# Patient Record
Sex: FEMALE | Race: WHITE | Employment: OTHER | URBAN - METROPOLITAN AREA
[De-identification: names, ages, dates, MRNs, and addresses within clinical notes are randomized per-mention and may not be internally consistent; named-entity substitution may affect disease eponyms.]

---

## 2022-02-24 ENCOUNTER — HOSPITAL ENCOUNTER (INPATIENT)
Age: 67
LOS: 1 days | Discharge: ANOTHER HOSPITAL | DRG: 282 | End: 2022-02-24
Attending: EMERGENCY MEDICINE
Payer: MEDICARE

## 2022-02-24 ENCOUNTER — APPOINTMENT (OUTPATIENT)
Dept: CT IMAGING | Age: 67
DRG: 282 | End: 2022-02-24
Payer: MEDICARE

## 2022-02-24 ENCOUNTER — HOSPITAL ENCOUNTER (INPATIENT)
Age: 67
LOS: 2 days | Discharge: HOME | DRG: 247 | End: 2022-02-26
Attending: INTERNAL MEDICINE | Admitting: STUDENT IN AN ORGANIZED HEALTH CARE EDUCATION/TRAINING PROGRAM
Payer: MEDICARE

## 2022-02-24 ENCOUNTER — APPOINTMENT (OUTPATIENT)
Dept: GENERAL RADIOLOGY | Age: 67
DRG: 282 | End: 2022-02-24
Payer: MEDICARE

## 2022-02-24 VITALS
HEART RATE: 74 BPM | DIASTOLIC BLOOD PRESSURE: 83 MMHG | OXYGEN SATURATION: 94 % | RESPIRATION RATE: 14 BRPM | HEIGHT: 65 IN | TEMPERATURE: 98.1 F | WEIGHT: 220 LBS | SYSTOLIC BLOOD PRESSURE: 132 MMHG | BODY MASS INDEX: 36.65 KG/M2

## 2022-02-24 DIAGNOSIS — I21.4 NSTEMI (NON-ST ELEVATED MYOCARDIAL INFARCTION) (HCC): Primary | ICD-10-CM

## 2022-02-24 DIAGNOSIS — I25.10 CAD IN NATIVE ARTERY: ICD-10-CM

## 2022-02-24 LAB
ABO/RH: NORMAL
ALBUMIN SERPL-MCNC: 4.3 G/DL (ref 3.5–5.2)
ALP BLD-CCNC: 102 U/L (ref 35–104)
ALT SERPL-CCNC: 34 U/L (ref 0–32)
ANION GAP SERPL CALCULATED.3IONS-SCNC: 14 MMOL/L (ref 7–16)
ANTIBODY SCREEN: NORMAL
APTT: 29.3 SEC (ref 24.5–35.1)
AST SERPL-CCNC: 31 U/L (ref 0–31)
BASOPHILS ABSOLUTE: 0.09 E9/L (ref 0–0.2)
BASOPHILS RELATIVE PERCENT: 0.9 % (ref 0–2)
BILIRUB SERPL-MCNC: 0.5 MG/DL (ref 0–1.2)
BILIRUBIN DIRECT: <0.2 MG/DL (ref 0–0.3)
BILIRUBIN, INDIRECT: ABNORMAL MG/DL (ref 0–1)
BUN BLDV-MCNC: 10 MG/DL (ref 6–23)
CALCIUM SERPL-MCNC: 9.1 MG/DL (ref 8.6–10.2)
CHLORIDE BLD-SCNC: 103 MMOL/L (ref 98–107)
CHOLESTEROL, TOTAL: 186 MG/DL (ref 0–199)
CO2: 22 MMOL/L (ref 22–29)
CREAT SERPL-MCNC: 0.8 MG/DL (ref 0.5–1)
D DIMER: <200 NG/ML DDU
EKG ATRIAL RATE: 73 BPM
EKG ATRIAL RATE: 78 BPM
EKG P AXIS: 60 DEGREES
EKG P AXIS: 65 DEGREES
EKG P-R INTERVAL: 132 MS
EKG P-R INTERVAL: 132 MS
EKG Q-T INTERVAL: 374 MS
EKG Q-T INTERVAL: 472 MS
EKG QRS DURATION: 82 MS
EKG QRS DURATION: 88 MS
EKG QTC CALCULATION (BAZETT): 426 MS
EKG QTC CALCULATION (BAZETT): 519 MS
EKG R AXIS: 25 DEGREES
EKG R AXIS: 30 DEGREES
EKG T AXIS: -9 DEGREES
EKG T AXIS: 41 DEGREES
EKG VENTRICULAR RATE: 73 BPM
EKG VENTRICULAR RATE: 78 BPM
EOSINOPHILS ABSOLUTE: 0.2 E9/L (ref 0.05–0.5)
EOSINOPHILS RELATIVE PERCENT: 2 % (ref 0–6)
GFR AFRICAN AMERICAN: >60
GFR NON-AFRICAN AMERICAN: >60 ML/MIN/1.73
GLUCOSE BLD-MCNC: 206 MG/DL (ref 74–99)
HBA1C MFR BLD: 8.8 % (ref 4–5.6)
HCT VFR BLD CALC: 41 % (ref 34–48)
HDLC SERPL-MCNC: 39 MG/DL
HEMOGLOBIN: 13.7 G/DL (ref 11.5–15.5)
IMMATURE GRANULOCYTES #: 0.14 E9/L
IMMATURE GRANULOCYTES %: 1.4 % (ref 0–5)
LDL CHOLESTEROL CALCULATED: 114 MG/DL (ref 0–99)
LYMPHOCYTES ABSOLUTE: 1.71 E9/L (ref 1.5–4)
LYMPHOCYTES RELATIVE PERCENT: 16.7 % (ref 20–42)
MAGNESIUM: 1.7 MG/DL (ref 1.6–2.6)
MCH RBC QN AUTO: 30.9 PG (ref 26–35)
MCHC RBC AUTO-ENTMCNC: 33.4 % (ref 32–34.5)
MCV RBC AUTO: 92.3 FL (ref 80–99.9)
MONOCYTES ABSOLUTE: 0.73 E9/L (ref 0.1–0.95)
MONOCYTES RELATIVE PERCENT: 7.1 % (ref 2–12)
NEUTROPHILS ABSOLUTE: 7.35 E9/L (ref 1.8–7.3)
NEUTROPHILS RELATIVE PERCENT: 71.9 % (ref 43–80)
PDW BLD-RTO: 13.2 FL (ref 11.5–15)
PLATELET # BLD: 232 E9/L (ref 130–450)
PMV BLD AUTO: 11.2 FL (ref 7–12)
POC ACT LR: 207 SECONDS
POC ACT LR: 280 SECONDS
POC ACT LR: 285 SECONDS
POC ACT LR: 307 SECONDS
POTASSIUM SERPL-SCNC: 3.4 MMOL/L (ref 3.5–5)
RBC # BLD: 4.44 E12/L (ref 3.5–5.5)
SARS-COV-2, NAAT: NOT DETECTED
SODIUM BLD-SCNC: 139 MMOL/L (ref 132–146)
T4 FREE: 1.13 NG/DL (ref 0.93–1.7)
TOTAL PROTEIN: 6.5 G/DL (ref 6.4–8.3)
TRIGL SERPL-MCNC: 164 MG/DL (ref 0–149)
TROPONIN, HIGH SENSITIVITY: 141 NG/L (ref 0–9)
TROPONIN, HIGH SENSITIVITY: 154 NG/L (ref 0–9)
TROPONIN, HIGH SENSITIVITY: 2183 NG/L (ref 0–9)
TSH SERPL DL<=0.05 MIU/L-ACNC: 0.89 UIU/ML (ref 0.27–4.2)
VLDLC SERPL CALC-MCNC: 33 MG/DL
WBC # BLD: 10.2 E9/L (ref 4.5–11.5)

## 2022-02-24 PROCEDURE — APPSS180 APP SPLIT SHARED TIME > 60 MINUTES: Performed by: NURSE PRACTITIONER

## 2022-02-24 PROCEDURE — 80076 HEPATIC FUNCTION PANEL: CPT

## 2022-02-24 PROCEDURE — 93454 CORONARY ARTERY ANGIO S&I: CPT | Performed by: INTERNAL MEDICINE

## 2022-02-24 PROCEDURE — 92929 PR PRQ TRLUML CORONARY STENT W/ANGIO ADDL ART/BRNCH: CPT | Performed by: INTERNAL MEDICINE

## 2022-02-24 PROCEDURE — 93005 ELECTROCARDIOGRAM TRACING: CPT | Performed by: EMERGENCY MEDICINE

## 2022-02-24 PROCEDURE — C1725 CATH, TRANSLUMIN NON-LASER: HCPCS

## 2022-02-24 PROCEDURE — 6360000002 HC RX W HCPCS: Performed by: INTERNAL MEDICINE

## 2022-02-24 PROCEDURE — 86850 RBC ANTIBODY SCREEN: CPT

## 2022-02-24 PROCEDURE — 84443 ASSAY THYROID STIM HORMONE: CPT

## 2022-02-24 PROCEDURE — 2500000003 HC RX 250 WO HCPCS

## 2022-02-24 PROCEDURE — 85025 COMPLETE CBC W/AUTO DIFF WBC: CPT

## 2022-02-24 PROCEDURE — 80061 LIPID PANEL: CPT

## 2022-02-24 PROCEDURE — 6370000000 HC RX 637 (ALT 250 FOR IP): Performed by: EMERGENCY MEDICINE

## 2022-02-24 PROCEDURE — 83735 ASSAY OF MAGNESIUM: CPT

## 2022-02-24 PROCEDURE — 92941 PRQ TRLML REVSC TOT OCCL AMI: CPT | Performed by: INTERNAL MEDICINE

## 2022-02-24 PROCEDURE — 99285 EMERGENCY DEPT VISIT HI MDM: CPT

## 2022-02-24 PROCEDURE — 87635 SARS-COV-2 COVID-19 AMP PRB: CPT

## 2022-02-24 PROCEDURE — 84439 ASSAY OF FREE THYROXINE: CPT

## 2022-02-24 PROCEDURE — 93005 ELECTROCARDIOGRAM TRACING: CPT | Performed by: NURSE PRACTITIONER

## 2022-02-24 PROCEDURE — 99223 1ST HOSP IP/OBS HIGH 75: CPT | Performed by: INTERNAL MEDICINE

## 2022-02-24 PROCEDURE — B2111ZZ FLUOROSCOPY OF MULTIPLE CORONARY ARTERIES USING LOW OSMOLAR CONTRAST: ICD-10-PCS | Performed by: INTERNAL MEDICINE

## 2022-02-24 PROCEDURE — 6370000000 HC RX 637 (ALT 250 FOR IP)

## 2022-02-24 PROCEDURE — 36415 COLL VENOUS BLD VENIPUNCTURE: CPT

## 2022-02-24 PROCEDURE — 83036 HEMOGLOBIN GLYCOSYLATED A1C: CPT

## 2022-02-24 PROCEDURE — 6370000000 HC RX 637 (ALT 250 FOR IP): Performed by: INTERNAL MEDICINE

## 2022-02-24 PROCEDURE — C1887 CATHETER, GUIDING: HCPCS

## 2022-02-24 PROCEDURE — 6360000002 HC RX W HCPCS: Performed by: NURSE PRACTITIONER

## 2022-02-24 PROCEDURE — 94640 AIRWAY INHALATION TREATMENT: CPT

## 2022-02-24 PROCEDURE — 80048 BASIC METABOLIC PNL TOTAL CA: CPT

## 2022-02-24 PROCEDURE — 85347 COAGULATION TIME ACTIVATED: CPT

## 2022-02-24 PROCEDURE — 6360000002 HC RX W HCPCS

## 2022-02-24 PROCEDURE — G0378 HOSPITAL OBSERVATION PER HR: HCPCS

## 2022-02-24 PROCEDURE — C1757 CATH, THROMBECTOMY/EMBOLECT: HCPCS

## 2022-02-24 PROCEDURE — C9600 PERC DRUG-EL COR STENT SING: HCPCS

## 2022-02-24 PROCEDURE — 93454 CORONARY ARTERY ANGIO S&I: CPT

## 2022-02-24 PROCEDURE — 85730 THROMBOPLASTIN TIME PARTIAL: CPT

## 2022-02-24 PROCEDURE — C1894 INTRO/SHEATH, NON-LASER: HCPCS

## 2022-02-24 PROCEDURE — 027135Z DILATION OF CORONARY ARTERY, TWO ARTERIES WITH TWO DRUG-ELUTING INTRALUMINAL DEVICES, PERCUTANEOUS APPROACH: ICD-10-PCS | Performed by: INTERNAL MEDICINE

## 2022-02-24 PROCEDURE — 85378 FIBRIN DEGRADE SEMIQUANT: CPT

## 2022-02-24 PROCEDURE — C1769 GUIDE WIRE: HCPCS

## 2022-02-24 PROCEDURE — 93010 ELECTROCARDIOGRAM REPORT: CPT | Performed by: INTERNAL MEDICINE

## 2022-02-24 PROCEDURE — 6370000000 HC RX 637 (ALT 250 FOR IP): Performed by: NURSE PRACTITIONER

## 2022-02-24 PROCEDURE — C1874 STENT, COATED/COV W/DEL SYS: HCPCS

## 2022-02-24 PROCEDURE — G0379 DIRECT REFER HOSPITAL OBSERV: HCPCS

## 2022-02-24 PROCEDURE — 1200000000 HC SEMI PRIVATE

## 2022-02-24 PROCEDURE — 93005 ELECTROCARDIOGRAM TRACING: CPT | Performed by: INTERNAL MEDICINE

## 2022-02-24 PROCEDURE — 71045 X-RAY EXAM CHEST 1 VIEW: CPT

## 2022-02-24 PROCEDURE — 02C03ZZ EXTIRPATION OF MATTER FROM CORONARY ARTERY, ONE ARTERY, PERCUTANEOUS APPROACH: ICD-10-PCS | Performed by: INTERNAL MEDICINE

## 2022-02-24 PROCEDURE — 84484 ASSAY OF TROPONIN QUANT: CPT

## 2022-02-24 PROCEDURE — 2709999900 HC NON-CHARGEABLE SUPPLY

## 2022-02-24 PROCEDURE — 86900 BLOOD TYPING SEROLOGIC ABO: CPT

## 2022-02-24 PROCEDURE — 86901 BLOOD TYPING SEROLOGIC RH(D): CPT

## 2022-02-24 PROCEDURE — C9601 PERC DRUG-EL COR STENT BRAN: HCPCS

## 2022-02-24 PROCEDURE — 2580000003 HC RX 258: Performed by: INTERNAL MEDICINE

## 2022-02-24 RX ORDER — SODIUM CHLORIDE 0.9 % (FLUSH) 0.9 %
5-40 SYRINGE (ML) INJECTION PRN
Status: DISCONTINUED | OUTPATIENT
Start: 2022-02-24 | End: 2022-02-26 | Stop reason: HOSPADM

## 2022-02-24 RX ORDER — HEPARIN SODIUM 1000 [USP'U]/ML
4000 INJECTION, SOLUTION INTRAVENOUS; SUBCUTANEOUS PRN
Status: DISCONTINUED | OUTPATIENT
Start: 2022-02-24 | End: 2022-02-24 | Stop reason: HOSPADM

## 2022-02-24 RX ORDER — MAGNESIUM SULFATE IN WATER 40 MG/ML
2000 INJECTION, SOLUTION INTRAVENOUS ONCE
Status: DISCONTINUED | OUTPATIENT
Start: 2022-02-24 | End: 2022-02-24 | Stop reason: HOSPADM

## 2022-02-24 RX ORDER — SODIUM CHLORIDE 9 MG/ML
INJECTION, SOLUTION INTRAVENOUS CONTINUOUS
Status: DISCONTINUED | OUTPATIENT
Start: 2022-02-24 | End: 2022-02-25

## 2022-02-24 RX ORDER — NITROGLYCERIN 0.4 MG/1
0.4 TABLET SUBLINGUAL ONCE
Status: COMPLETED | OUTPATIENT
Start: 2022-02-24 | End: 2022-02-24

## 2022-02-24 RX ORDER — SODIUM CHLORIDE 9 MG/ML
INJECTION, SOLUTION INTRAVENOUS ONCE
Status: DISCONTINUED | OUTPATIENT
Start: 2022-02-24 | End: 2022-02-24

## 2022-02-24 RX ORDER — MONTELUKAST SODIUM 10 MG/1
10 TABLET ORAL NIGHTLY
Status: DISCONTINUED | OUTPATIENT
Start: 2022-02-24 | End: 2022-02-26 | Stop reason: HOSPADM

## 2022-02-24 RX ORDER — ATORVASTATIN CALCIUM 40 MG/1
40 TABLET, FILM COATED ORAL NIGHTLY
Status: DISCONTINUED | OUTPATIENT
Start: 2022-02-24 | End: 2022-02-24 | Stop reason: HOSPADM

## 2022-02-24 RX ORDER — HEPARIN SODIUM 1000 [USP'U]/ML
4000 INJECTION, SOLUTION INTRAVENOUS; SUBCUTANEOUS ONCE
Status: COMPLETED | OUTPATIENT
Start: 2022-02-24 | End: 2022-02-24

## 2022-02-24 RX ORDER — MONTELUKAST SODIUM 10 MG/1
10 TABLET ORAL NIGHTLY
COMMUNITY

## 2022-02-24 RX ORDER — LOSARTAN POTASSIUM 50 MG/1
50 TABLET ORAL DAILY
Status: DISCONTINUED | OUTPATIENT
Start: 2022-02-25 | End: 2022-02-26 | Stop reason: HOSPADM

## 2022-02-24 RX ORDER — ALBUTEROL SULFATE 90 UG/1
2 AEROSOL, METERED RESPIRATORY (INHALATION) EVERY 4 HOURS PRN
Status: DISCONTINUED | OUTPATIENT
Start: 2022-02-24 | End: 2022-02-24 | Stop reason: CLARIF

## 2022-02-24 RX ORDER — HEPARIN SODIUM 1000 [USP'U]/ML
2000 INJECTION, SOLUTION INTRAVENOUS; SUBCUTANEOUS PRN
Status: DISCONTINUED | OUTPATIENT
Start: 2022-02-24 | End: 2022-02-24 | Stop reason: HOSPADM

## 2022-02-24 RX ORDER — ASPIRIN 81 MG/1
81 TABLET, CHEWABLE ORAL DAILY
Status: DISCONTINUED | OUTPATIENT
Start: 2022-02-25 | End: 2022-02-26 | Stop reason: HOSPADM

## 2022-02-24 RX ORDER — METOPROLOL SUCCINATE 25 MG/1
50 TABLET, EXTENDED RELEASE ORAL DAILY
Status: DISCONTINUED | OUTPATIENT
Start: 2022-02-24 | End: 2022-02-24 | Stop reason: HOSPADM

## 2022-02-24 RX ORDER — LOSARTAN POTASSIUM AND HYDROCHLOROTHIAZIDE 12.5; 5 MG/1; MG/1
1 TABLET ORAL NIGHTLY
Status: ON HOLD | COMMUNITY
End: 2022-02-25 | Stop reason: HOSPADM

## 2022-02-24 RX ORDER — UMECLIDINIUM 62.5 UG/1
1 AEROSOL, POWDER ORAL DAILY PRN
COMMUNITY

## 2022-02-24 RX ORDER — ALBUTEROL SULFATE 90 UG/1
2 AEROSOL, METERED RESPIRATORY (INHALATION) EVERY 4 HOURS PRN
COMMUNITY

## 2022-02-24 RX ORDER — BUDESONIDE AND FORMOTEROL FUMARATE DIHYDRATE 160; 4.5 UG/1; UG/1
2 AEROSOL RESPIRATORY (INHALATION) 2 TIMES DAILY
COMMUNITY

## 2022-02-24 RX ORDER — OMEPRAZOLE 20 MG/1
20 CAPSULE, DELAYED RELEASE ORAL 2 TIMES DAILY
Status: ON HOLD | COMMUNITY
End: 2022-02-25 | Stop reason: HOSPADM

## 2022-02-24 RX ORDER — POTASSIUM CHLORIDE 20 MEQ/1
40 TABLET, EXTENDED RELEASE ORAL ONCE
Status: DISCONTINUED | OUTPATIENT
Start: 2022-02-24 | End: 2022-02-24 | Stop reason: HOSPADM

## 2022-02-24 RX ORDER — IPRATROPIUM BROMIDE AND ALBUTEROL SULFATE 2.5; .5 MG/3ML; MG/3ML
1 SOLUTION RESPIRATORY (INHALATION) EVERY 4 HOURS PRN
COMMUNITY

## 2022-02-24 RX ORDER — BUDESONIDE AND FORMOTEROL FUMARATE DIHYDRATE 160; 4.5 UG/1; UG/1
2 AEROSOL RESPIRATORY (INHALATION) 2 TIMES DAILY
Status: DISCONTINUED | OUTPATIENT
Start: 2022-02-24 | End: 2022-02-24 | Stop reason: CLARIF

## 2022-02-24 RX ORDER — ASPIRIN 81 MG/1
324 TABLET, CHEWABLE ORAL ONCE
Status: COMPLETED | OUTPATIENT
Start: 2022-02-24 | End: 2022-02-24

## 2022-02-24 RX ORDER — ARFORMOTEROL TARTRATE 15 UG/2ML
15 SOLUTION RESPIRATORY (INHALATION) 2 TIMES DAILY
Status: DISCONTINUED | OUTPATIENT
Start: 2022-02-24 | End: 2022-02-26 | Stop reason: HOSPADM

## 2022-02-24 RX ORDER — BUDESONIDE 0.5 MG/2ML
0.5 INHALANT ORAL 2 TIMES DAILY
Status: DISCONTINUED | OUTPATIENT
Start: 2022-02-24 | End: 2022-02-26 | Stop reason: HOSPADM

## 2022-02-24 RX ORDER — ATORVASTATIN CALCIUM 80 MG/1
80 TABLET, FILM COATED ORAL NIGHTLY
Status: DISCONTINUED | OUTPATIENT
Start: 2022-02-24 | End: 2022-02-26 | Stop reason: HOSPADM

## 2022-02-24 RX ORDER — SODIUM CHLORIDE 0.9 % (FLUSH) 0.9 %
5-40 SYRINGE (ML) INJECTION EVERY 12 HOURS SCHEDULED
Status: DISCONTINUED | OUTPATIENT
Start: 2022-02-24 | End: 2022-02-26 | Stop reason: HOSPADM

## 2022-02-24 RX ORDER — SODIUM CHLORIDE 9 MG/ML
25 INJECTION, SOLUTION INTRAVENOUS PRN
Status: DISCONTINUED | OUTPATIENT
Start: 2022-02-24 | End: 2022-02-26 | Stop reason: HOSPADM

## 2022-02-24 RX ORDER — METOPROLOL SUCCINATE 25 MG/1
25 TABLET, EXTENDED RELEASE ORAL DAILY
Status: DISCONTINUED | OUTPATIENT
Start: 2022-02-24 | End: 2022-02-26 | Stop reason: HOSPADM

## 2022-02-24 RX ORDER — ONDANSETRON 2 MG/ML
4 INJECTION INTRAMUSCULAR; INTRAVENOUS ONCE
Status: COMPLETED | OUTPATIENT
Start: 2022-02-24 | End: 2022-02-24

## 2022-02-24 RX ORDER — ASPIRIN 81 MG/1
81 TABLET ORAL DAILY
Status: DISCONTINUED | OUTPATIENT
Start: 2022-02-24 | End: 2022-02-24 | Stop reason: HOSPADM

## 2022-02-24 RX ORDER — PANTOPRAZOLE SODIUM 40 MG/1
40 TABLET, DELAYED RELEASE ORAL
Status: DISCONTINUED | OUTPATIENT
Start: 2022-02-25 | End: 2022-02-26 | Stop reason: HOSPADM

## 2022-02-24 RX ORDER — NITROGLYCERIN 0.4 MG/1
0.4 TABLET SUBLINGUAL EVERY 5 MIN PRN
Status: DISCONTINUED | OUTPATIENT
Start: 2022-02-24 | End: 2022-02-24 | Stop reason: HOSPADM

## 2022-02-24 RX ORDER — SODIUM CHLORIDE 0.9 % (FLUSH) 0.9 %
10 SYRINGE (ML) INJECTION PRN
Status: DISCONTINUED | OUTPATIENT
Start: 2022-02-24 | End: 2022-02-24 | Stop reason: HOSPADM

## 2022-02-24 RX ORDER — IPRATROPIUM BROMIDE AND ALBUTEROL SULFATE 2.5; .5 MG/3ML; MG/3ML
1 SOLUTION RESPIRATORY (INHALATION) EVERY 4 HOURS PRN
Status: DISCONTINUED | OUTPATIENT
Start: 2022-02-24 | End: 2022-02-26 | Stop reason: HOSPADM

## 2022-02-24 RX ORDER — HEPARIN SODIUM 10000 [USP'U]/100ML
5-30 INJECTION, SOLUTION INTRAVENOUS CONTINUOUS
Status: DISCONTINUED | OUTPATIENT
Start: 2022-02-24 | End: 2022-02-24 | Stop reason: HOSPADM

## 2022-02-24 RX ORDER — ACETAMINOPHEN 325 MG/1
650 TABLET ORAL EVERY 4 HOURS PRN
Status: DISCONTINUED | OUTPATIENT
Start: 2022-02-24 | End: 2022-02-26 | Stop reason: HOSPADM

## 2022-02-24 RX ADMIN — HEPARIN SODIUM 4000 UNITS: 1000 INJECTION INTRAVENOUS; SUBCUTANEOUS at 08:36

## 2022-02-24 RX ADMIN — ONDANSETRON HYDROCHLORIDE 4 MG: 2 INJECTION, SOLUTION INTRAMUSCULAR; INTRAVENOUS at 14:20

## 2022-02-24 RX ADMIN — NITROGLYCERIN 0.4 MG: 0.4 TABLET SUBLINGUAL at 07:19

## 2022-02-24 RX ADMIN — METOPROLOL SUCCINATE 25 MG: 25 TABLET, EXTENDED RELEASE ORAL at 16:56

## 2022-02-24 RX ADMIN — ATORVASTATIN CALCIUM 80 MG: 80 TABLET, FILM COATED ORAL at 20:38

## 2022-02-24 RX ADMIN — SODIUM CHLORIDE, PRESERVATIVE FREE 10 ML: 5 INJECTION INTRAVENOUS at 20:39

## 2022-02-24 RX ADMIN — MONTELUKAST SODIUM 10 MG: 10 TABLET ORAL at 20:38

## 2022-02-24 RX ADMIN — TICAGRELOR 90 MG: 90 TABLET ORAL at 20:38

## 2022-02-24 RX ADMIN — NITROGLYCERIN 0.4 MG: 0.4 TABLET SUBLINGUAL at 10:02

## 2022-02-24 RX ADMIN — NITROGLYCERIN 0.4 MG: 0.4 TABLET SUBLINGUAL at 08:49

## 2022-02-24 RX ADMIN — SODIUM CHLORIDE: 9 INJECTION, SOLUTION INTRAVENOUS at 15:38

## 2022-02-24 RX ADMIN — ARFORMOTEROL TARTRATE 15 MCG: 15 SOLUTION RESPIRATORY (INHALATION) at 18:58

## 2022-02-24 RX ADMIN — BUDESONIDE 500 MCG: 0.5 SUSPENSION RESPIRATORY (INHALATION) at 18:58

## 2022-02-24 RX ADMIN — ASPIRIN 324 MG: 81 TABLET, CHEWABLE ORAL at 06:39

## 2022-02-24 ASSESSMENT — ENCOUNTER SYMPTOMS
BACK PAIN: 0
SHORTNESS OF BREATH: 0
WHEEZING: 0
EYE REDNESS: 0
COUGH: 0
EYE PAIN: 0
DIARRHEA: 0
ABDOMINAL DISTENTION: 0
TROUBLE SWALLOWING: 0
ABDOMINAL PAIN: 0
VOMITING: 0
SORE THROAT: 0
NAUSEA: 0
EYE DISCHARGE: 0
SINUS PRESSURE: 0

## 2022-02-24 ASSESSMENT — PAIN SCALES - GENERAL
PAINLEVEL_OUTOF10: 7
PAINLEVEL_OUTOF10: 0
PAINLEVEL_OUTOF10: 5
PAINLEVEL_OUTOF10: 9
PAINLEVEL_OUTOF10: 0

## 2022-02-24 NOTE — H&P
Hospitalist History & Physical      PCP: WARD Keys - CNP    Date of Admission: 2/24/2022    Date of Service: Pt seen/examined on 2/24/2022 and is admitted to Inpatient with expected LOS greater than two midnights due to medical therapy. Chief Complaint:  NSTEMI    History Of Present Illness:    Ms. Joe Nuno, a 73y.o. year old female  who  has a past medical history of COPD (chronic obstructive pulmonary disease) ([facility]) and Hypertension. chandlersmili from [facility] for heart cath 2/2 NSTEMI      Past Medical History:        Diagnosis Date    COPD (chronic obstructive pulmonary disease) ([address]     Hypertension        Past Surgical History:        Procedure Laterality Date    CARPAL TUNNEL RELEASE Right     CORONARY ANGIOPLASTY WITH STENT PLACEMENT  02/24/2022    3.0 x 18 Resolute stent to the prox diag and a 3.0 x 26 Resolute stent to the prox LAD by Dr. Addi Larsen         Medications Prior to Admission:      Prior to Admission medications    Medication Sig Start Date End Date Taking?  Authorizing Provider   losartan-hydroCHLOROthiazide (HYZAAR) 50-12.5 MG per tablet Take 1 tablet by mouth nightly   Yes Historical Provider, MD   Umeclidinium Bromide (INCRUSE ELLIPTA) 62.5 MCG/INH AEPB Inhale 1 puff into the lungs daily as needed (SOB/WHEEZING)   Yes Historical Provider, MD   ipratropium-albuterol (DUONEB) 0.5-2.5 (3) MG/3ML SOLN nebulizer solution Inhale 1 vial into the lungs every 4 hours as needed for Shortness of Breath   Yes Historical Provider, MD   montelukast (SINGULAIR) 10 MG tablet Take 10 mg by mouth nightly   Yes Historical Provider, MD   omeprazole (PRILOSEC) 20 MG delayed release capsule Take 20 mg by mouth 2 times daily   Yes Historical Provider, MD   albuterol sulfate HFA (VENTOLIN HFA) 108 (90 Base) MCG/ACT inhaler Inhale 2 puffs into the lungs every 4 hours as needed for Wheezing or Shortness of Breath   Yes Historical Provider, MD   budesonide-formoterol (SYMBICORT) 160-4.5 MCG/ACT AERO Inhale 2 puffs into the lungs 2 times daily   Yes Historical Provider, MD       Allergies:  Patient has no known allergies. Social History:    TOBACCO:   reports that she has been smoking. She has been smoking about 0.25 packs per day. She has never used smokeless tobacco.  ETOH:   reports previous alcohol use. Family History:    Reviewed in detail and negative for DM, CAD, Cancer, CVA. Positive as follows\"  No family history on file. REVIEW OF SYSTEMS:     Gen= den fever  Heent= den epistaxis den ear discharge  Cv= as per hpi  Pulmon= den cough  Gi= den hematochezia denies diarrhea  Gu= dem hematuria  Gyn= den vagin spotting  Endocr= denies breast discharge  Derm= den exanthems  Mskltl= + ankle edema mild    PHYSICAL EXAM:  BP (!) 122/93   Pulse 80   Temp 97.3 °F (36.3 °C)   Resp 18   Ht 5' 5\" (1.651 m)   Wt 220 lb (99.8 kg)   SpO2 93%   BMI 36.61 kg/m²   General appearance: not diaphoretic   HEENT: sinus not tender to percussion or palpation  Neck:. Trachea midline.   Respiratory:  Clear sounds upper lobes  Cardiovascular: s1 s2 audible pulse reg    Abdomen: no distension  Musculoskeletal: No clubbing, no mm pain to palpation  Skin: no exanthems or eruptions on exposed regions of Ue or face or LE  Neurologic:  Awake alert speech clear without aphasia or dysarthria  Psychiatric: calm and cooperative attentive and well amnnered  Reviewed EKG  #1 ) 0920  Sr/nl axis/rate 73 no st segm elevation  #2) 6408  Sr/nl axis/no st segment elevation     Non specif st segment changes        CXR 2/24/2022  To my view no blunting of costophrenic angles or lobar infiltrate or opacity   to radiologist   no acute process    CBC:   Recent Labs     02/24/22  0636   WBC 10.2   RBC 4.44   HGB 13.7   HCT 41.0   MCV 92.3   RDW 13.2        BMP:   Recent Labs     02/24/22  0636      K 3.4*      CO2 22   BUN 10   CREATININE 0.8   MG 1.7 LFT:  Recent Labs     02/24/22 0636   PROT 6.5   ALKPHOS 102   ALT 34*   AST 31   BILITOT 0.5     CE:  No results for input(s): Jacki Marcelo in the last 72 hours. PT/INR:   Recent Labs     02/24/22 0636   APTT 29.3     BNP: No results for input(s): BNP in the last 72 hours. ESR: No results found for: SEDRATE  CRP: No results found for: CRP  D Dimer:   Lab Results   Component Value Date    DDIMER <200 02/24/2022      Folate and B12: No results found for: LDEDARTO36, No results found for: FOLATE  Lactic Acid: No results found for: LACTA  Thyroid Studies:   Lab Results   Component Value Date    TSH 0.891 02/24/2022       Oupatient labs:  Lab Results   Component Value Date    CHOL 186 02/24/2022    TRIG 164 (H) 02/24/2022    HDL 39 02/24/2022    LDLCALC 114 (H) 02/24/2022    TSH 0.891 02/24/2022    LABA1C 8.8 (H) 02/24/2022       Urinalysis:  No results found for: NITRU, WBCUA, BACTERIA, RBCUA, BLOODU, SPECGRAV, GLUCOSEU    Imaging:  XR CHEST PORTABLE    Result Date: 2/24/2022  EXAMINATION: ONE XRAY VIEW OF THE CHEST 2/24/2022 7:36 am COMPARISON: 08/16/2013 HISTORY: ORDERING SYSTEM PROVIDED HISTORY: chest pain TECHNOLOGIST PROVIDED HISTORY: Reason for exam:->chest pain FINDINGS: The lungs are without acute focal process. There is no effusion or pneumothorax. The cardiomediastinal silhouette is without acute process. The osseous structures are without acute process. No acute process. ASSESSMENT:s/p heart cath with stenting    Principal Problem:    CAD in native artery  Active Problems:    NSTEMI (non-ST elevated myocardial infarction) ([facility]  Resolved Problems:    * No resolved hospital problems. *  copd chrnc not in exacerbation  Class III obesity  prob randall   PLAN:  As per cardiology protocols following heart cath  Continue controler inhalers for copd therapy          Diet: ADULT DIET;  Regular; Low Fat/Low Chol/High Fiber/2 gm Na  Code Status: Full Code    Surrogate decision maker confirmed with patient:  Primary Emergency Contact: Saturnino Simmons, Josr Phone: [phone]    DVT Prophylaxis: []Lovenox []Heparin []PCD [] [address] []Encouraged ambulation    Disposition: [x]Med/Surg [] Intermediate [] ICU/CCU  Admit status: [] Observation [x] Inpatient     +++++++++++++++++++++++++++++++++++++++++++++++++  Idalia Howard DO  [address]  +++++++++++++++++++++++++++++++++++++++++++++++++  NOTE: This report was transcribed using voice recognition software. Every effort was made to ensure accuracy; however, inadvertent computerized transcription errors may be present.

## 2022-02-24 NOTE — CONSULTS
Inpatient Cardiology Consultation      Reason for Consult:  NSTEMI    Consulting Physician: Dr Maria Isabel Kenyon    Requesting Physician:  Dr Sekou Blakley    Date of Consultation: 2/24/2022    HISTORY OF PRESENT ILLNESS: 74 yo  female not previously known to any cardiologist.  PCP @ [facility] in [place] (last time 1 year) used to live in [place] now lives in [facility]    PMH: HTN, BMI 36, tobacco abuse, COPD, snores ( no reported sleep study), UNVACCINATED. Last six months noticed CP with climbing up steps resolved with rest. Reports chronically SOB with minimal activity states its from \"my COPD. \"    L arm started hurting around noon then a couple hours later L side of chest achiness, R neck pain, felt dizzy, diaphoretic rated a 10 out of 10. 's yesterday @ home  Nitroglycerin SL x 1 upon arrival--> helped a little with pain-->  Nitroglycerin SL x 2 (0830) -->Currently CP 6 out of 10 appears tachypneic and uncomfortable during conversation    [facility] 2/24/2022 BP upon arrival 193/113, HR 80's SR, afebrile, and O2 saturation 96% on RA. Na 139, K+ 3.4, Bun/Cr 10/0.8, troponin 144-->154, , CBC WNL, D-dimer <200. K+ not supplemented    Currently /89 HR 70's SR, afebrile, and O2 saturation 95% on RA. There is some reproducibility with chest and R neck pain. AUC 8-3      Please note: past medical records were reviewed per electronic medical record (EMR) - see detailed reports under Past Medical/ Surgical History. Past Medical History:    1. Mother PCI in her 63's  2. BMI 36  3. 20 pack year smoker  4. Remote history of stress test  5. COPD  6. UNVACCINATED  7. HTN on Cozaar/HCTZ (50/12.5)  8. L carpal tunnel syndrome  9. Snores no history of sleep study    Past Surgical History:   Tonsillectomy, R carpal tunnel release    Medications Prior to admit: On no medications      Current Medications:    Current Facility-Administered Medications: sodium chloride flush 0.9 % injection 10 mL, 10 mL, IntraVENous, PRN    Allergies:  NKDA per patient    Social History:    1 pack a week currently when stressed smokes more  Denies ETOH  Denies Illicit drug use  Activity: Lives with  in 2 story home (farmhouse). Climbs steps is \"winded\" for forever. Code Status: Full Code      Family History: Mother  age 80 from brain tumor. + tobacco. +HTN, + DM. CAD s/p PCI (67 yo) s/p CABG in her [de-identified]  Father alive [de-identified]. CAD s/p CABG 80, VHD s/p AVR/MVR 80's, + DM, + HLD, + HTN  Sisters  63's from ETOH abuse  Sister alive no reported CAD, DM      REVIEW OF SYSTEMS:     · Constitutional: Denies fatigue, fevers, chills or night sweats  · Eyes: Denies visual changes or drainage  · ENT: Denies headaches or hearing loss. No mouth sores or sore throat. No epistaxis   · Cardiovascular: + L sided CP, Down L arm and into R neck with diaphoresis, and dizziness. Denies chest pain, pressure or palpitations. No lower extremity swelling. · Respiratory: + chronic GAGNON. Denies cough, orthopnea or PND. No hemoptysis   · Gastrointestinal: Denies hematemesis or anorexia. No hematochezia or melena    · Genitourinary: Denies urgency, dysuria or hematuria. · Musculoskeletal: Denies gait disturbance, weakness or joint complaints  · Integumentary: Denies rash, hives or pruritis   · Neurological: Denies dizziness, headaches or seizures. No numbness or tingling  · Psychiatric: Denies anxiety or depression. · Endocrine: Denies temperature intolerance. No recent weight change. .  · Hematologic/Lymphatic: Denies abnormal bruising or bleeding. No swollen lymph nodes    PHYSICAL EXAM:   /89   Pulse 70   Temp 97.9 °F (36.6 °C)   Resp 20   Ht 5' 5\" (1.651 m)   Wt 220 lb (99.8 kg)   SpO2 95%   BMI 36.61 kg/m²   CONST:  Well developed, obese  female who appears of stated age. Awake, alert and cooperative. No apparent distress.    HEENT:   Head- Normocephalic, atraumatic   Eyes- Conjunctivae pink, anicteric  Throat- Oral mucosa pink and moist  Neck-  Thick. No stridor, trachea midline, no jugular venous distention. No carotid bruit. CHEST: Chest symmetrical + tender to palpation. No accessory muscle use or intercostal retractions  RESPIRATORY: Lung sounds - clear throughout fields   CARDIOVASCULAR:     Heart Ausculation- Regular rate and rhythm, no murmur heard. PV: No lower extremity edema. No varicosities. Pedal pulses palpable, no clubbing or cyanosis  ABDOMEN: Soft, non-tender to light palpation. Bowel sounds present. No palpable masses; no abdominal bruit  MS: Good muscle strength and tone. No atrophy or abnormal movements. + bilateral calf tenderness. + R bilateral neck tenderness. : Deferred  SKIN: Warm and dry no statis dermatitis or ulcers   NEURO / PSYCH: Oriented to person, place and time. Speech clear and appropriate. Follows all commands. Pleasant affect     DATA:    ECG  as per Dr Mitchell All interpretation  Tele strips: SR    Diagnostic:    CXR 2/24/2022: No acute process      Labs:   CBC:   Recent Labs     02/24/22  0636   WBC 10.2   HGB 13.7   HCT 41.0        BMP:   Recent Labs     02/24/22  0636      K 3.4*   CO2 22   BUN 10   CREATININE 0.8   LABGLOM >60   CALCIUM 9.1     CARDIAC ENZYMES:  Recent Labs     02/24/22  0636   TROPHS 141*   A&P per Dr Renate Prajapati  Electronically signed by FREDY Alvarez on 2/24/2022 at 7:51 AM     I independently performed an evaluation on the patient. I have reviewed the above documentation completed by the advance practitioner. Please see my additional contributions to the HPI, physical exam, assessment and medical decision making.     Physical Exam   BP (!) 151/97   Pulse 67   Temp 98.1 °F (36.7 °C) (Oral)   Resp 14   Ht 5' 5\" (1.651 m)   Wt 220 lb (99.8 kg)   SpO2 95%   BMI 36.61 kg/m²   Constitutional: Oriented to person, place, and time. Well-developed and well-nourished. No distress. Head: Normocephalic and atraumatic. Eyes: EOM are normal. Pupils are equal, round, and reactive to light. Neck: Normal range of motion. Neck supple. No hepatojugular reflux and no JVD present. Carotid bruit is not present. No tracheal deviation present. No thyromegaly present. Cardiovascular: Normal rate, regular rhythm, normal heart sounds and intact distal pulses. Exam reveals no gallop and no friction rub. No murmur heard. Pulmonary/Chest: Effort normal and breath sounds normal. No respiratory distress. No wheezes. No rales. No tenderness. Abdominal: Soft. Bowel sounds are normal. No distension and no mass. No tenderness. No rebound and no guarding. Musculoskeletal: Normal range of motion. No edema and no tenderness. Lymphadenopathy:   No cervical adenopathy. Neurological: Alert and oriented to person, place, and time. Skin: Skin is warm and dry. No rash noted. Not diaphoretic. No erythema. Psychiatric: Normal mood and affect. Behavior is normal.      See accompanying documentation for full consult.     ASSESSMENT AND PLAN:  1. NSTEMI/Exertional CP:     EKG and labs reviewed.      Typical symptoms and elevated troponin.      Arrange cath. AUC 8/4. No contrast allergy.      ASA/statin/BB/IV heparin/nitrate.     Replete K and mag.      2. HTN: Observe.      3. COPD     4. Tobacco use     5. Probable EDMUNDO     Keith Shi D.O.   Cardiologist  Cardiology, [phone]

## 2022-02-24 NOTE — PROCEDURES
[address]                  [address] [id number],  [facility]                            CARDIAC CATHETERIZATION    PATIENT NAME: Christi Gonsalez                    :        1955  MED REC NO:   [id number]                            ROOM:       6484  ACCOUNT NO:   [de-identified]                           ADMIT DATE: 2022  PROVIDER:     Sameer Lino MD    DATE OF PROCEDURE:  2022    PROCEDURES:  1. Coronary angiography. 2.  Percutaneous coronary intervention with deployment of 3.0 x 18 mm  [place] Resolute drug-eluting stent in proximal and mid LAD. 3.  Deployment of 3.0 x 26 mm [place] Resolute drug-eluting stent in first  diagonal branch. 4.  Aspiration thrombectomy of first diagonal branch. 5.  Conscious sedation using Versed and fentanyl. Indication #4, score of 8. Indication for PCI is 16 and 8. INDICATION FOR PROCEDURE:  The patient is a 60-year-old female who has a  significant family history of early CAD, smoking, presented to [facility]  Emergency Room complaining of chest pain. The patient was found to have  non-ST-elevation MI. The patient's symptoms were not responding to  optimal medical therapy, so the patient was brought to the cath lab on  an urgent fashion for cardiac catheterization with the intention to  intervene as warranted. DESCRIPTION OF PROCEDURE:  After the appropriate informed consent, the  right wrist area was prepped and draped in the usual sterile fashion. A  timeout was completed. The right wrist area was locally anesthetized  with 2 mL of 2% lidocaine. A 21-gauge needle was used to access the  right radial artery. A 6-[place] citizen introducer sheath was used to cannulate  the right radial artery. A 6-[place] citizen JL4 and 6-[place] citizen JR4 catheters were  used for coronary angiography in multiple projections. We tried to use  JL3.5 for left coronary angiography unsuccessfully.     ANGIOGRAPHIC FINDINGS:  The left main coronary artery arises normally from the left sinus of  Valsalva. It is relatively short vessel, good-sized vessel without any  disease. It bifurcates into left anterior descending artery and left  circumflex artery. The left anterior descending artery is a large vessel extends down to  the apex. It has 20% disease just distal to the take off a large  diagonal branch. The first diagonal branch is a large vessel with 90%  proximal stenosis with a large thrombus in the proximal and mid segment  with NIKKI-2 flow distally. The left circumflex artery is a large vessel, gives off two large OM  branches. The left circumflex artery and its branches have no CAD. The right coronary artery arises normally from the right sinus of  Valsalva. It is a large vessel, good-sized vessel, dominant circulation  with 20% disease in the mid segment. After completing coronary angiography, we proceeded with percutaneous  coronary intervention that turned out to be very long and complex  procedure due to technical difficulty with some equipments. We were  able to engage the left main coronary artery using a CLS 3.5 guiding  catheter; however, the catheter was deep engaging and subselectively and  selectively engaging the left circumflex artery. Then, we tried to use  a CLS3; however, it was too short and it was not really engaging the  left main coronary artery and was not coaxial.  So, we opted to go ahead and use a  6-English JL4 guiding catheter. Then, we used a BMW wire to cross into  the diagonal branch. Then, we used another wire to protect the LAD  since there is ostial diagonal disease and that might affect the  LAD after stenting. Then, the lesion was predilated using a 2.0 x 15 mm  balloon. Then, we tried to advance [place] catheter, but we were not  able to advance it through the catheter, so we had to block the LAD  wire.   Then, we advanced the [place] catheter into the diagonal branch  and two aspirations were performed. Followup angiography showed  significant decrease of the thrombotic load. Then, we predilated the  diagonal branch using 2.5 x 12 mm balloon. Then, we rewired the LAD  again and tried to advance a balloon into the LAD for final kissing  balloon dilation as needed. However, we had difficulty advancing the  balloon over the wire. We tried to cover balloons that we were not  able. Then, we tried the balloon over the diagonal wire and it went  smoothly. At that time, the LAD wire was exchanged for another wire. We advanced like 3.0 x 12 mm noncompliant balloon into mid to distal  LAD. Then, we tried to advance a 3.0 x 26 mm David Resolute drug-eluting  stent into the diagonal branch during that and when we performed cine to  check the position of the stent, a dissection noted in the proximal LAD. So at that time, the balloon and the catheter and stents were taken out  and that area was stented using 3.0 x 18 mm David Resolute drug-eluting  stent into proximal to mid LAD, then proximal optimization was performed  using 4.0 x 8 mm noncompliant balloon. Then, the diagonal branch was  rewired, and we predilated the struts using 2.0 x 12 mm balloon and 2.5  x 12 mm noncompliant balloon. Then, the stent was advanced into the  diagonal branch in TAP bifurcation technique. Before that, we advanced a 3.0 x 12 mm balloon  into the LAD for the kissing balloon postdilation. The stent was  deployed. Followup angiography showed 0% residual stenosis and there  was no dissection distally, so we proceeded with kissing balloon  inflation. We advanced a 3.0 x 12 mm noncompliant balloon into the  proximal segment of the stent and kissing balloon inflation was  performed of the diagonal stent and the distal segment of the LAD stent. Followup angiography showed 0% residual stenosis and NIKKI-3 flow  distally. There is still 20% disease in the mid segment that was left  alone to be treated medically.   At the end

## 2022-02-24 NOTE — PROGRESS NOTES
vasc band discontinued per protocol. Site soft ecchymotic cleaned with alcohol dsd applied with opsite drsg applied over that.  Radial pulse 2 plus

## 2022-02-24 NOTE — CONSULTS
I independently performed an evaluation on the patient. I have reviewed the above documentation completed by the advance practitioner. Please see my additional contributions to the HPI, physical exam, assessment and medical decision making. Physical Exam   BP (!) 151/97   Pulse 67   Temp 98.1 °F (36.7 °C) (Oral)   Resp 14   Ht 5' 5\" (1.651 m)   Wt 220 lb (99.8 kg)   SpO2 95%   BMI 36.61 kg/m²   Constitutional: Oriented to person, place, and time. Well-developed and well-nourished. No distress. Head: Normocephalic and atraumatic. Eyes: EOM are normal. Pupils are equal, round, and reactive to light. Neck: Normal range of motion. Neck supple. No hepatojugular reflux and no JVD present. Carotid bruit is not present. No tracheal deviation present. No thyromegaly present. Cardiovascular: Normal rate, regular rhythm, normal heart sounds and intact distal pulses. Exam reveals no gallop and no friction rub. No murmur heard. Pulmonary/Chest: Effort normal and breath sounds normal. No respiratory distress. No wheezes. No rales. No tenderness. Abdominal: Soft. Bowel sounds are normal. No distension and no mass. No tenderness. No rebound and no guarding. Musculoskeletal: Normal range of motion. No edema and no tenderness. Lymphadenopathy:   No cervical adenopathy. Neurological: Alert and oriented to person, place, and time. Skin: Skin is warm and dry. No rash noted. Not diaphoretic. No erythema. Psychiatric: Normal mood and affect. Behavior is normal.     See accompanying documentation for full consult. ASSESSMENT AND PLAN:  1. NSTEMI/Exertional CP:    EKG and labs reviewed. Typical symptoms and elevated troponin. Arrange cath. AUC 8/4. No contrast allergy. ASA/statin/BB/IV heparin/nitrate. Replete K and mag. 2. HTN: Observe. 3. COPD    4. Tobacco use    5. Probable EDMUNDO    Latonya Cerrato D.O.   Cardiologist  Cardiology, [id number]

## 2022-02-24 NOTE — ED PROVIDER NOTES
21-year-old female history of hypertension history of smoking presents to the emergency department with chest pain that started yesterday. She states most notably pain rating to left arm causing tingling as well as into her right neck. Patient states she had a stress test many years ago but is not followed up with a cardiologist.  She states also some left hip pain that is chronic with bilateral lower extremity swelling she states no fevers no cough no shortness of breath no nausea vomiting diarrhea abdominal pain urinary symptoms or other complaints at this time    The history is provided by the patient. Chest Pain  Pain location:  Substernal area  Pain radiates to:  L shoulder, L arm and neck  Pain severity:  Mild  Onset quality:  Gradual  Duration:  1 day  Timing:  Intermittent  Progression:  Waxing and waning  Chronicity:  New  Relieved by:  Nothing  Worsened by:  Nothing  Ineffective treatments:  None tried  Associated symptoms: numbness    Associated symptoms: no abdominal pain, no altered mental status, no anxiety, no back pain, no cough, no diaphoresis, no dysphagia, no fever, no headache, no lower extremity edema, no nausea, no near-syncope, no palpitations, no shortness of breath, no syncope, no vomiting and no weakness    Risk factors: hypertension and smoking         Review of Systems   Constitutional: Negative for chills, diaphoresis and fever. HENT: Negative for ear pain, sinus pressure, sore throat and trouble swallowing. Eyes: Negative for pain, discharge and redness. Respiratory: Negative for cough, shortness of breath and wheezing. Cardiovascular: Positive for chest pain. Negative for palpitations, syncope and near-syncope. Gastrointestinal: Negative for abdominal distention, abdominal pain, diarrhea, nausea and vomiting. Genitourinary: Negative for dysuria and frequency. Musculoskeletal: Negative for arthralgias and back pain. Skin: Negative for rash and wound. Neurological: Positive for numbness. Negative for weakness and headaches. Hematological: Negative for adenopathy. All other systems reviewed and are negative. Physical Exam  Constitutional:       Appearance: Normal appearance. She is obese. HENT:      Head: Normocephalic and atraumatic. Nose: Nose normal.   Eyes:      Extraocular Movements: Extraocular movements intact. Pupils: Pupils are equal, round, and reactive to light. Cardiovascular:      Rate and Rhythm: Normal rate and regular rhythm. Heart sounds: Normal heart sounds. Musculoskeletal:      Right lower le+ Edema present. Left lower le+ Edema present. Skin:     General: Skin is warm. Capillary Refill: Capillary refill takes less than 2 seconds. Neurological:      General: No focal deficit present. Mental Status: She is alert and oriented to person, place, and time. Procedures     [facility]     ED Course as of 22 0937   Thu 2022   0741 Spoke with radiology department at approximately 7:17 AM about emergent need for CTA of chest and abdomen a second call was placed at 7:40 AM after was noted the emergent need for the scan to be done [CF]   [facility] who I have not spoken with has placed orders for NSTEMI work-up including low-dose heparin. Patient's chest pain is improved with nitroglycerin [CF]   0810 CT studies canceled after negative D-dimer.   We will go forward with heparin protocol [CF]   0935 Dr. Viridiana Ortiz is at bedside and we received a call from the [address] [place] [address] that they plan to transport patient to the Cath Lab for emergent cath patient resting comfortably with improved chest pain after being given nitro patient on heparin drip at this time patient in the process of being set up for transfer to [facility] [CF]      ED Course User Index  [CF] Yandy Bran, DO       --------------------------------------------- PAST HISTORY ---------------------------------------------  Past Medical History:  has a past medical history of COPD (chronic obstructive pulmonary disease) ([address] and Hypertension. Past Surgical History:  has a past surgical history that includes Tonsillectomy; Uterine fibroid surgery; and Carpal tunnel release (Right). Social History:  reports that she has been smoking. She has been smoking about 0.25 packs per day. She has never used smokeless tobacco. She reports previous alcohol use. She reports previous drug use. Family History: family history is not on file. The patients home medications have been reviewed. Allergies: Patient has no known allergies.     -------------------------------------------------- RESULTS -------------------------------------------------    Lab  Results for orders placed or performed during the hospital encounter of 02/24/22   CBC with Auto Differential   Result Value Ref Range    WBC 10.2 4.5 - 11.5 E9/L    RBC 4.44 3.50 - 5.50 E12/L    Hemoglobin 13.7 11.5 - 15.5 g/dL    Hematocrit 41.0 34.0 - 48.0 %    MCV 92.3 80.0 - 99.9 fL    MCH 30.9 26.0 - 35.0 pg    MCHC 33.4 32.0 - 34.5 %    RDW 13.2 11.5 - 15.0 fL    Platelets 218 150 - 779 E9/L    MPV 11.2 7.0 - 12.0 fL    Neutrophils % 71.9 43.0 - 80.0 %    Immature Granulocytes % 1.4 0.0 - 5.0 %    Lymphocytes % 16.7 (L) 20.0 - 42.0 %    Monocytes % 7.1 2.0 - 12.0 %    Eosinophils % 2.0 0.0 - 6.0 %    Basophils % 0.9 0.0 - 2.0 %    Neutrophils Absolute 7.35 (H) 1.80 - 7.30 E9/L    Immature Granulocytes # 0.14 E9/L    Lymphocytes Absolute 1.71 1.50 - 4.00 E9/L    Monocytes Absolute 0.73 0.10 - 0.95 E9/L    Eosinophils Absolute 0.20 0.05 - 0.50 E9/L    Basophils Absolute 0.09 0.00 - 0.20 M8/B   Basic Metabolic Panel   Result Value Ref Range    Sodium 139 132 - 146 mmol/L    Potassium 3.4 (L) 3.5 - 5.0 mmol/L    Chloride 103 98 - 107 mmol/L    CO2 22 22 - 29 mmol/L    Anion Gap 14 7 - 16 mmol/L    Glucose 206 (H) 74 - 99 mg/dL    BUN 10 6 - 23 mg/dL    CREATININE 0.8 0.5 - 1.0 mg/dL    GFR Non-African American >60 >=60 mL/min/1.73    GFR African American >60     Calcium 9.1 8.6 - 10.2 mg/dL   Troponin   Result Value Ref Range    Troponin, High Sensitivity 141 (H) 0 - 9 ng/L   D-Dimer, Quantitative   Result Value Ref Range    D-Dimer, Quant <200 ng/mL DDU   Troponin   Result Value Ref Range    Troponin, High Sensitivity 154 (H) 0 - 9 ng/L   APTT   Result Value Ref Range    aPTT 29.3 24.5 - 35.1 sec   Lipid Panel   Result Value Ref Range    Cholesterol, Total 186 0 - 199 mg/dL    Triglycerides 164 (H) 0 - 149 mg/dL    HDL 39 >40 mg/dL    LDL Calculated 114 (H) 0 - 99 mg/dL    VLDL Cholesterol Calculated 33 mg/dL   TSH   Result Value Ref Range    TSH 0.891 0.270 - 4.200 uIU/mL   T4, Free   Result Value Ref Range    T4 Free 1.13 0.93 - 1.70 ng/dL   Magnesium   Result Value Ref Range    Magnesium 1.7 1.6 - 2.6 mg/dL   Hepatic Function Panel   Result Value Ref Range    Total Protein 6.5 6.4 - 8.3 g/dL    Albumin 4.3 3.5 - 5.2 g/dL    Alkaline Phosphatase 102 35 - 104 U/L    ALT 34 (H) 0 - 32 U/L    AST 31 0 - 31 U/L    Total Bilirubin 0.5 0.0 - 1.2 mg/dL    Bilirubin, Direct <0.2 0.0 - 0.3 mg/dL    Bilirubin, Indirect see below 0.0 - 1.0 mg/dL   EKG 12 Lead   Result Value Ref Range    Ventricular Rate 73 BPM    Atrial Rate 73 BPM    P-R Interval 132 ms    QRS Duration 82 ms    Q-T Interval 472 ms    QTc Calculation (Bazett) 519 ms    P Axis 60 degrees    R Axis 30 degrees    T Axis 41 degrees       Radiology  XR CHEST PORTABLE    Result Date: 2/24/2022  EXAMINATION: ONE XRAY VIEW OF THE CHEST 2/24/2022 7:36 am COMPARISON: 08/16/2013 HISTORY: ORDERING SYSTEM PROVIDED HISTORY: chest pain TECHNOLOGIST PROVIDED HISTORY: Reason for exam:->chest pain FINDINGS: The lungs are without acute focal process. There is no effusion or pneumothorax. The cardiomediastinal silhouette is without acute process. The osseous structures are without acute process.      No acute process. EKG: This EKG is signed and interpreted by me. Rhythm: Sinus  Interpretation: Sinus rhythm with nonspecific but noted t wave inversions   Comparison: no previous EKG available      ------------------------- NURSING NOTES AND VITALS REVIEWED ---------------------------  Date / Time Roomed:  2/24/2022  6:04 AM  ED Bed Assignment:  12/12    The nursing notes within the ED encounter and vital signs as below have been reviewed. Patient Vitals for the past 24 hrs:   BP Temp Temp src Pulse Resp SpO2 Height Weight   02/24/22 0840 (!) 151/97 98.1 °F (36.7 °C) Oral 67 14 95 % -- --   02/24/22 0735 133/89 -- -- 70 -- 95 % -- --   02/24/22 0711 (!) 174/105 97.9 °F (36.6 °C) -- 70 20 96 % -- --   02/24/22 0604 (!) 193/113 97.7 °F (36.5 °C) Tympanic 84 22 96 % 5' 5\" (1.651 m) 220 lb (99.8 kg)       Oxygen Saturation Interpretation: Normal      ------------------------------------------ PROGRESS NOTES ------------------------------------------  I have spoken with the patient and discussed todays results, in addition to providing specific details for the plan of care and counseling regarding the diagnosis and prognosis. Their questions are answered at this time and they are agreeable with the plan.      --------------------------------- ADDITIONAL PROVIDER NOTES ---------------------------------  This patient's ED course included: a personal history and physicial examination, re-evaluation prior to disposition, multiple bedside re-evaluations, IV medications, cardiac monitoring, continuous pulse oximetry and complex medical decision making and emergency management    This patient has remained hemodynamically stable during their ED course. Please note that the withdrawal or failure to initiate urgent interventions for this patient would likely result in a life threatening deterioration or permanent disability.       Accordingly this patient received 30 minutes of critical care time, excluding separately billable procedures. Clinical Impression  1. NSTEMI (non-ST elevated myocardial infarction) [facility])          Disposition  Patient's disposition: Transfer to [facility]  Patient's condition is fair.        Risa Beckham, DO  02/24/22 40 Lashawn Watt, DO  02/24/22 0064

## 2022-02-25 LAB
ANION GAP SERPL CALCULATED.3IONS-SCNC: 11 MMOL/L (ref 7–16)
ANION GAP SERPL CALCULATED.3IONS-SCNC: 12 MMOL/L (ref 7–16)
BUN BLDV-MCNC: 11 MG/DL (ref 6–23)
BUN BLDV-MCNC: 9 MG/DL (ref 6–23)
CALCIUM SERPL-MCNC: 7.5 MG/DL (ref 8.6–10.2)
CALCIUM SERPL-MCNC: 8.3 MG/DL (ref 8.6–10.2)
CHLORIDE BLD-SCNC: 107 MMOL/L (ref 98–107)
CHLORIDE BLD-SCNC: 107 MMOL/L (ref 98–107)
CO2: 22 MMOL/L (ref 22–29)
CO2: 23 MMOL/L (ref 22–29)
CREAT SERPL-MCNC: 0.8 MG/DL (ref 0.5–1)
CREAT SERPL-MCNC: 1 MG/DL (ref 0.5–1)
EKG ATRIAL RATE: 78 BPM
EKG P AXIS: 62 DEGREES
EKG P-R INTERVAL: 130 MS
EKG Q-T INTERVAL: 420 MS
EKG QRS DURATION: 80 MS
EKG QTC CALCULATION (BAZETT): 478 MS
EKG R AXIS: 43 DEGREES
EKG T AXIS: 153 DEGREES
EKG VENTRICULAR RATE: 78 BPM
GFR AFRICAN AMERICAN: >60
GFR AFRICAN AMERICAN: >60
GFR NON-AFRICAN AMERICAN: 56 ML/MIN/1.73
GFR NON-AFRICAN AMERICAN: >60 ML/MIN/1.73
GLUCOSE BLD-MCNC: 147 MG/DL (ref 74–99)
GLUCOSE BLD-MCNC: 155 MG/DL (ref 74–99)
HCT VFR BLD CALC: 34 % (ref 34–48)
HEMOGLOBIN: 11.2 G/DL (ref 11.5–15.5)
MAGNESIUM: 1.7 MG/DL (ref 1.6–2.6)
MCH RBC QN AUTO: 30.9 PG (ref 26–35)
MCHC RBC AUTO-ENTMCNC: 32.9 % (ref 32–34.5)
MCV RBC AUTO: 93.9 FL (ref 80–99.9)
PDW BLD-RTO: 13.4 FL (ref 11.5–15)
PLATELET # BLD: 191 E9/L (ref 130–450)
PMV BLD AUTO: 11.9 FL (ref 7–12)
POTASSIUM SERPL-SCNC: 2.9 MMOL/L (ref 3.5–5)
POTASSIUM SERPL-SCNC: 3 MMOL/L (ref 3.5–5)
RBC # BLD: 3.62 E12/L (ref 3.5–5.5)
SODIUM BLD-SCNC: 140 MMOL/L (ref 132–146)
SODIUM BLD-SCNC: 142 MMOL/L (ref 132–146)
WBC # BLD: 11.4 E9/L (ref 4.5–11.5)

## 2022-02-25 PROCEDURE — 85027 COMPLETE CBC AUTOMATED: CPT

## 2022-02-25 PROCEDURE — 94640 AIRWAY INHALATION TREATMENT: CPT

## 2022-02-25 PROCEDURE — 6370000000 HC RX 637 (ALT 250 FOR IP): Performed by: INTERNAL MEDICINE

## 2022-02-25 PROCEDURE — 6370000000 HC RX 637 (ALT 250 FOR IP): Performed by: STUDENT IN AN ORGANIZED HEALTH CARE EDUCATION/TRAINING PROGRAM

## 2022-02-25 PROCEDURE — 6360000002 HC RX W HCPCS: Performed by: INTERNAL MEDICINE

## 2022-02-25 PROCEDURE — 2580000003 HC RX 258: Performed by: INTERNAL MEDICINE

## 2022-02-25 PROCEDURE — 93010 ELECTROCARDIOGRAM REPORT: CPT | Performed by: INTERNAL MEDICINE

## 2022-02-25 PROCEDURE — 6360000002 HC RX W HCPCS: Performed by: STUDENT IN AN ORGANIZED HEALTH CARE EDUCATION/TRAINING PROGRAM

## 2022-02-25 PROCEDURE — 83735 ASSAY OF MAGNESIUM: CPT

## 2022-02-25 PROCEDURE — 99232 SBSQ HOSP IP/OBS MODERATE 35: CPT | Performed by: INTERNAL MEDICINE

## 2022-02-25 PROCEDURE — 36415 COLL VENOUS BLD VENIPUNCTURE: CPT

## 2022-02-25 PROCEDURE — 2140000000 HC CCU INTERMEDIATE R&B

## 2022-02-25 PROCEDURE — 80048 BASIC METABOLIC PNL TOTAL CA: CPT

## 2022-02-25 RX ORDER — PANTOPRAZOLE SODIUM 40 MG/1
40 TABLET, DELAYED RELEASE ORAL
Qty: 90 TABLET | Refills: 3 | Status: SHIPPED | OUTPATIENT
Start: 2022-02-26

## 2022-02-25 RX ORDER — LOSARTAN POTASSIUM 50 MG/1
50 TABLET ORAL DAILY
Qty: 90 TABLET | Refills: 3 | Status: SHIPPED | OUTPATIENT
Start: 2022-02-26 | End: 2022-03-21

## 2022-02-25 RX ORDER — POTASSIUM CHLORIDE 20 MEQ/1
40 TABLET, EXTENDED RELEASE ORAL ONCE
Status: COMPLETED | OUTPATIENT
Start: 2022-02-25 | End: 2022-02-25

## 2022-02-25 RX ORDER — POTASSIUM CHLORIDE 20 MEQ/1
40 TABLET, EXTENDED RELEASE ORAL 2 TIMES DAILY WITH MEALS
Status: DISCONTINUED | OUTPATIENT
Start: 2022-02-25 | End: 2022-02-26 | Stop reason: HOSPADM

## 2022-02-25 RX ORDER — ATORVASTATIN CALCIUM 80 MG/1
80 TABLET, FILM COATED ORAL NIGHTLY
Qty: 90 TABLET | Refills: 3 | Status: SHIPPED | OUTPATIENT
Start: 2022-02-25 | End: 2022-05-31 | Stop reason: SDUPTHER

## 2022-02-25 RX ORDER — POTASSIUM CHLORIDE 7.45 MG/ML
10 INJECTION INTRAVENOUS ONCE
Status: COMPLETED | OUTPATIENT
Start: 2022-02-25 | End: 2022-02-25

## 2022-02-25 RX ORDER — CLOPIDOGREL BISULFATE 75 MG/1
75 TABLET ORAL DAILY
Status: DISCONTINUED | OUTPATIENT
Start: 2022-02-26 | End: 2022-02-26 | Stop reason: HOSPADM

## 2022-02-25 RX ORDER — CLOPIDOGREL 300 MG/1
600 TABLET, FILM COATED ORAL ONCE
Status: COMPLETED | OUTPATIENT
Start: 2022-02-25 | End: 2022-02-25

## 2022-02-25 RX ORDER — ASPIRIN 81 MG/1
81 TABLET ORAL DAILY
Qty: 180 TABLET | Refills: 3 | Status: SHIPPED | OUTPATIENT
Start: 2022-02-25

## 2022-02-25 RX ORDER — METOPROLOL SUCCINATE 25 MG/1
25 TABLET, EXTENDED RELEASE ORAL DAILY
Qty: 90 TABLET | Refills: 3 | Status: SHIPPED | OUTPATIENT
Start: 2022-02-26 | End: 2022-05-31 | Stop reason: SDUPTHER

## 2022-02-25 RX ORDER — CLOPIDOGREL BISULFATE 75 MG/1
75 TABLET ORAL DAILY
Qty: 90 TABLET | Refills: 3 | Status: SHIPPED | OUTPATIENT
Start: 2022-02-26 | End: 2022-05-31 | Stop reason: SDUPTHER

## 2022-02-25 RX ADMIN — ASPIRIN 81 MG 81 MG: 81 TABLET ORAL at 09:13

## 2022-02-25 RX ADMIN — BUDESONIDE 500 MCG: 0.5 SUSPENSION RESPIRATORY (INHALATION) at 21:28

## 2022-02-25 RX ADMIN — PANTOPRAZOLE SODIUM 40 MG: 40 TABLET, DELAYED RELEASE ORAL at 05:58

## 2022-02-25 RX ADMIN — POTASSIUM CHLORIDE 10 MEQ: 7.46 INJECTION, SOLUTION INTRAVENOUS at 09:16

## 2022-02-25 RX ADMIN — LOSARTAN POTASSIUM 50 MG: 50 TABLET, FILM COATED ORAL at 09:13

## 2022-02-25 RX ADMIN — ATORVASTATIN CALCIUM 80 MG: 80 TABLET, FILM COATED ORAL at 22:00

## 2022-02-25 RX ADMIN — POTASSIUM CHLORIDE 10 MEQ: 7.46 INJECTION, SOLUTION INTRAVENOUS at 18:24

## 2022-02-25 RX ADMIN — BUDESONIDE 500 MCG: 0.5 SUSPENSION RESPIRATORY (INHALATION) at 07:59

## 2022-02-25 RX ADMIN — POTASSIUM CHLORIDE 40 MEQ: 20 TABLET, EXTENDED RELEASE ORAL at 18:14

## 2022-02-25 RX ADMIN — SODIUM CHLORIDE, PRESERVATIVE FREE 10 ML: 5 INJECTION INTRAVENOUS at 22:01

## 2022-02-25 RX ADMIN — ARFORMOTEROL TARTRATE 15 MCG: 15 SOLUTION RESPIRATORY (INHALATION) at 21:28

## 2022-02-25 RX ADMIN — ARFORMOTEROL TARTRATE 15 MCG: 15 SOLUTION RESPIRATORY (INHALATION) at 07:59

## 2022-02-25 RX ADMIN — MONTELUKAST SODIUM 10 MG: 10 TABLET ORAL at 22:00

## 2022-02-25 RX ADMIN — METOPROLOL SUCCINATE 25 MG: 25 TABLET, EXTENDED RELEASE ORAL at 09:13

## 2022-02-25 RX ADMIN — CLOPIDOGREL BISULFATE 600 MG: 300 TABLET, FILM COATED ORAL at 18:16

## 2022-02-25 RX ADMIN — POTASSIUM CHLORIDE 40 MEQ: 20 TABLET, EXTENDED RELEASE ORAL at 09:36

## 2022-02-25 RX ADMIN — ACETAMINOPHEN 650 MG: 325 TABLET ORAL at 09:12

## 2022-02-25 RX ADMIN — TICAGRELOR 90 MG: 90 TABLET ORAL at 09:13

## 2022-02-25 RX ADMIN — Medication 400 MG: at 18:16

## 2022-02-25 ASSESSMENT — PAIN DESCRIPTION - ORIENTATION: ORIENTATION: RIGHT

## 2022-02-25 ASSESSMENT — PAIN SCALES - GENERAL
PAINLEVEL_OUTOF10: 0
PAINLEVEL_OUTOF10: 6
PAINLEVEL_OUTOF10: 8
PAINLEVEL_OUTOF10: 0

## 2022-02-25 ASSESSMENT — PAIN DESCRIPTION - LOCATION: LOCATION: HAND

## 2022-02-25 NOTE — PROGRESS NOTES
Hospitalist Progress Note      SYNOPSIS: Patient admitted on 2022 for NSTEMI s/p cardiac cath       SUBJECTIVE:    Patient seen and examined at bedside. States she feels good, no chest pain, no sob  K is low and is been replaced. No other concerns    Records reviewed. Stable overnight. No other overnight issues reported. Temp (24hrs), Av.3 °F (36.3 °C), Min:97 °F (36.1 °C), Max:97.5 °F (36.4 °C)    DIET: ADULT DIET; Regular; Low Fat/Low Chol/High Fiber/2 gm Na  CODE: Full Code    Intake/Output Summary (Last 24 hours) at 2022 1531  Last data filed at 2022 1314  Gross per 24 hour   Intake 1100 ml   Output --   Net 1100 ml       OBJECTIVE:    /78   Pulse 70   Temp 97.3 °F (36.3 °C) (Temporal)   Resp 16   Ht 5' 5\" (1.651 m)   Wt 220 lb (99.8 kg)   SpO2 96%   BMI 36.61 kg/m²     General appearance: No apparent distress, appears stated age and cooperative. HEENT:  Conjunctivae/corneas clear. Neck: Supple. No jugular venous distention. Respiratory: Clear to auscultation bilaterally, normal respiratory effort  Cardiovascular: Regular rate rhythm, normal S1-S2  Abdomen: Soft, nontender, nondistended  Musculoskeletal: No clubbing, cyanosis, no bilateral lower extremity edema. Brisk capillary refill. Skin:  No rashes  on visible skin  Neurologic: awake, alert and following commands     ASSESSMENT and PLAN:    NSTEMI  S/p PCI with drug-eluting stent in proximal and mid LAD and first  diagonal branch. Doing well  Cardiology reconsult appreciated  DAPT for at least a year. Then ASA for life   Smoking cessation  High intensity Statins, BB    Hypokalemia  - Replacing  - Mg 1.7.  Oral replacement to above 2.0    DISPOSITION: home tomorrow AM    Medications:  REVIEWED DAILY    Infusion Medications    sodium chloride       Scheduled Medications    magnesium oxide  400 mg Oral Daily    clopidogrel  600 mg Oral Once    [START ON 2022] clopidogrel  75 mg Oral Daily    potassium chloride  40 mEq Oral BID WC    potassium chloride  10 mEq IntraVENous Once    sodium chloride flush  5-40 mL IntraVENous 2 times per day    aspirin  81 mg Oral Daily    montelukast  10 mg Oral Nightly    pantoprazole  40 mg Oral QAM AC    losartan  50 mg Oral Daily    metoprolol succinate  25 mg Oral Daily    atorvastatin  80 mg Oral Nightly    Arformoterol Tartrate  15 mcg Nebulization BID    And    budesonide  0.5 mg Nebulization BID     PRN Meds: sodium chloride flush, sodium chloride, acetaminophen, ipratropium-albuterol, perflutren lipid microspheres    Labs:     Recent Labs     02/24/22  0636 02/25/22  0545   WBC 10.2 11.4   HGB 13.7 11.2*   HCT 41.0 34.0    191       Recent Labs     02/24/22  0636 02/25/22  0544 02/25/22  1430    142 140   K 3.4* 2.9* 3.0*    107 107   CO2 22 23 22   BUN 10 9 11   CREATININE 0.8 0.8 1.0   CALCIUM 9.1 7.5* 8.3*       Recent Labs     02/24/22  0636   PROT 6.5   ALKPHOS 102   ALT 34*   AST 31   BILITOT 0.5       No results for input(s): INR in the last 72 hours. No results for input(s): Nori Anchors in the last 72 hours. Chronic labs:    Lab Results   Component Value Date    CHOL 186 02/24/2022    TRIG 164 (H) 02/24/2022    HDL 39 02/24/2022    LDLCALC 114 (H) 02/24/2022    TSH 0.891 02/24/2022    LABA1C 8.8 (H) 02/24/2022       Radiology: REVIEWED DAILY    +++++++++++++++++++++++++++++++++++++++++++++++++  Louise Pruitt MD  [facility] - [address] [place] [address]  +++++++++++++++++++++++++++++++++++++++++++++++++  NOTE: This report was transcribed using voice recognition software. Every effort was made to ensure accuracy; however, inadvertent computerized transcription errors may be present.

## 2022-02-25 NOTE — PROGRESS NOTES
Patient is seen in follow-up for non-ST elevation MI     Subjective:     Ms. Argenis Tirado  feels better today, denies any chest pain or shortness of breath  Lying in bed no apparent distress    ROS:  CONSTITUTIONAL:  negative for  fevers, chills  HEENT:  negative for earaches, nasal congestion and epistaxis  RESPIRATORY:  negative for  dry cough, cough with sputum,wheezing and hemoptysis  GASTROINTESTINAL:  negative for nausea, vomiting  MUSCULOSKELETAL:  negative for  myalgias, arthralgias  NEUROLOGICAL:  negative for visual disturbance, dysphagia    Medication side effects: none    Scheduled Meds:   magnesium oxide  400 mg Oral Daily    sodium chloride flush  5-40 mL IntraVENous 2 times per day    aspirin  81 mg Oral Daily    ticagrelor  90 mg Oral BID    montelukast  10 mg Oral Nightly    pantoprazole  40 mg Oral QAM AC    losartan  50 mg Oral Daily    metoprolol succinate  25 mg Oral Daily    atorvastatin  80 mg Oral Nightly    Arformoterol Tartrate  15 mcg Nebulization BID    And    budesonide  0.5 mg Nebulization BID     Continuous Infusions:   sodium chloride       PRN Meds:sodium chloride flush, sodium chloride, acetaminophen, ipratropium-albuterol, perflutren lipid microspheres    I/O last 3 completed shifts: In: 740 [P.O.:240;  I.V.:500]  Out: -   I/O this shift:  In: 360 [P.O.:360]  Out: -       Objective:      Physical Exam:   /78   Pulse 70   Temp 97.3 °F (36.3 °C) (Temporal)   Resp 16   Ht 5' 5\" (1.651 m)   Wt 220 lb (99.8 kg)   SpO2 96%   BMI 36.61 kg/m²   CONSTITUTIONAL:  awake, alert, cooperative, no apparent distress, and appears stated age  HEAD:  normocepalic, without obvious abnormality, atraumatic  NECK:  Supple, symmetrical, trachea midline, no adenopathy, thyroid symmetric, not enlarged and no tenderness, skin normal  LUNGS:  No increased work of breathing, No accessory muscle use or intercostal retractions, good air exchange, clear to auscultation bilaterally, no crackles or wheezing  CARDIOVASCULAR:  Normal apical impulse, regular rate and rhythm, normal S1 and S2, no S3 or S4, and no murmur noted, no edema, no JVD, no carotid bruit. ABDOMEN:  Soft, nontender, no masses, no hepatomegaly, no splenomegaly, BS+  MUSCULOSKELETAL:  No clubbing no cyanosis. there is no redness, warmth, or swelling of the joints  full range of motion noted  NEUROLOGIC:  Alert, awake,oriented x3  SKIN:  no bruising or bleeding, normal skin color, texture, turgor and no redness, warmth, or swelling      Cardiographics  I personally reviewed the telemetry monitor strips with the following interpretation:  Echocardiogram: not done    Imaging  No orders to display       Lab Review   Lab Results   Component Value Date     02/25/2022    K 2.9 02/25/2022     02/25/2022    CO2 23 02/25/2022    BUN 9 02/25/2022    CREATININE 0.8 02/25/2022    GLUCOSE 147 02/25/2022    CALCIUM 7.5 02/25/2022     Lab Results   Component Value Date    WBC 11.4 02/25/2022    HGB 11.2 02/25/2022    HCT 34.0 02/25/2022    MCV 93.9 02/25/2022     02/25/2022     I have personally reviewed the laboratory, cardiac diagnostic and radiographic testing as outlined above:    Assessment:     1. Non-ST elevation MI: S/p PCI to LAD and diagonal branch, doing fine, will continue current treatment  2. Tobacco abuse: Patient was counseled regarding smoking cessation  3. Hypertension: Controlled    Recommendations:     1. Continue current treatment  2. Risk of instent thrombosis due to premature discontinuation of either ASA or Plavix discussed with patient at length  3.  Cardiac rehab  4. Follow-up with your PCP as scheduled  5. Follow-up with Dr. Jeremias Hightower (cardiology) in 3 to 4 weeks      Electronically signed by Zak Davidson MD on 2/25/2022 at 2:53 PM  NOTE: This report was transcribed using voice recognition software.  Every effort was made to ensure accuracy; however, inadvertent computerized transcription errors may be present

## 2022-02-25 NOTE — PROGRESS NOTES
CLINICAL PHARMACY NOTE: MEDS TO BEDS    Total # of Prescriptions Filled: 5   The following medications were delivered to the patient:  · Atorvastatin calcium 80 mg  · Pantoprazole sodium 40 mg  · Losartan potassium 50 mg  · Metoprolol succinate 25 mg  · Clopidogrel bisulfate 75 mg    Additional Documentation:

## 2022-02-25 NOTE — CONSULTS
Met with patient and discussed that their physician has ordered a referral to our outpatient Phase II Cardiac Rehabilitation program. Reviewed the benefits of cardiac rehabilitation based on their diagnosis and personal risk factors. Patient demonstrates moderate interest in Cardiac Rehabilitation at this time. Cardiac Rehabilitation brochure provided to patient/family. The Cardiac Rehabilitation Program has been provided the patient's referral information and pertinent patient details and history. The patient may call [facility] [place] at [phone] for additional information or questions. Contact information for [facility] and other choices close to the patient's residence have been provided in the discharge instructions so that the patient may call and schedule an appointment when cleared by their physician.  Thank you for the referral.

## 2022-02-26 VITALS
WEIGHT: 220 LBS | TEMPERATURE: 98.8 F | HEIGHT: 65 IN | RESPIRATION RATE: 16 BRPM | OXYGEN SATURATION: 99 % | HEART RATE: 70 BPM | SYSTOLIC BLOOD PRESSURE: 130 MMHG | DIASTOLIC BLOOD PRESSURE: 75 MMHG | BODY MASS INDEX: 36.65 KG/M2

## 2022-02-26 LAB
ANION GAP SERPL CALCULATED.3IONS-SCNC: 11 MMOL/L (ref 7–16)
BUN BLDV-MCNC: 16 MG/DL (ref 6–23)
CALCIUM SERPL-MCNC: 9.1 MG/DL (ref 8.6–10.2)
CHLORIDE BLD-SCNC: 112 MMOL/L (ref 98–107)
CO2: 22 MMOL/L (ref 22–29)
CREAT SERPL-MCNC: 0.9 MG/DL (ref 0.5–1)
GFR AFRICAN AMERICAN: >60
GFR NON-AFRICAN AMERICAN: >60 ML/MIN/1.73
GLUCOSE BLD-MCNC: 182 MG/DL (ref 74–99)
LV EF: 55 %
LVEF MODALITY: NORMAL
POTASSIUM SERPL-SCNC: 3.9 MMOL/L (ref 3.5–5)
SODIUM BLD-SCNC: 145 MMOL/L (ref 132–146)

## 2022-02-26 PROCEDURE — 6370000000 HC RX 637 (ALT 250 FOR IP): Performed by: INTERNAL MEDICINE

## 2022-02-26 PROCEDURE — 93306 TTE W/DOPPLER COMPLETE: CPT

## 2022-02-26 PROCEDURE — 36415 COLL VENOUS BLD VENIPUNCTURE: CPT

## 2022-02-26 PROCEDURE — 94640 AIRWAY INHALATION TREATMENT: CPT

## 2022-02-26 PROCEDURE — 2580000003 HC RX 258: Performed by: INTERNAL MEDICINE

## 2022-02-26 PROCEDURE — 6360000002 HC RX W HCPCS: Performed by: INTERNAL MEDICINE

## 2022-02-26 PROCEDURE — 6370000000 HC RX 637 (ALT 250 FOR IP): Performed by: STUDENT IN AN ORGANIZED HEALTH CARE EDUCATION/TRAINING PROGRAM

## 2022-02-26 PROCEDURE — 80048 BASIC METABOLIC PNL TOTAL CA: CPT

## 2022-02-26 RX ORDER — POTASSIUM CHLORIDE 20 MEQ/1
20 TABLET, EXTENDED RELEASE ORAL DAILY
Qty: 1 TABLET | Refills: 0 | Status: SHIPPED | OUTPATIENT
Start: 2022-02-27 | End: 2022-03-22

## 2022-02-26 RX ADMIN — METOPROLOL SUCCINATE 25 MG: 25 TABLET, EXTENDED RELEASE ORAL at 10:52

## 2022-02-26 RX ADMIN — CLOPIDOGREL BISULFATE 75 MG: 75 TABLET ORAL at 10:52

## 2022-02-26 RX ADMIN — Medication 400 MG: at 10:52

## 2022-02-26 RX ADMIN — ARFORMOTEROL TARTRATE 15 MCG: 15 SOLUTION RESPIRATORY (INHALATION) at 09:25

## 2022-02-26 RX ADMIN — PANTOPRAZOLE SODIUM 40 MG: 40 TABLET, DELAYED RELEASE ORAL at 06:07

## 2022-02-26 RX ADMIN — POTASSIUM CHLORIDE 40 MEQ: 20 TABLET, EXTENDED RELEASE ORAL at 10:52

## 2022-02-26 RX ADMIN — LOSARTAN POTASSIUM 50 MG: 50 TABLET, FILM COATED ORAL at 10:52

## 2022-02-26 RX ADMIN — ASPIRIN 81 MG 81 MG: 81 TABLET ORAL at 10:52

## 2022-02-26 RX ADMIN — SODIUM CHLORIDE, PRESERVATIVE FREE 10 ML: 5 INJECTION INTRAVENOUS at 09:00

## 2022-02-26 RX ADMIN — BUDESONIDE 500 MCG: 0.5 SUSPENSION RESPIRATORY (INHALATION) at 09:25

## 2022-02-26 ASSESSMENT — PAIN SCALES - GENERAL
PAINLEVEL_OUTOF10: 0

## 2022-02-26 NOTE — DISCHARGE INSTR - OTHER ORDERS
Coronary Angioplasty: What to Expect at [facility]     Coronary angioplasty is a procedure that is used to open a narrowed or blocked coronary artery. It may also be called a percutaneous coronary intervention (PCI). The doctor opened your narrowed or blocked artery by putting a thin tube, called a catheter, into your heart through a blood vessel. The catheter was inserted into the blood vessel in your groin or wrist. The doctor may have placed a small tube, called a stent, in the artery. Your groin or wrist may have a bruise and feel sore for a few days after the procedure. You can do light activities around the house. But don't do anything strenuous until your doctor says it is okay. This may be for several days. This care sheet gives you a general idea about how long it will take for you to recover. But each person recovers at a different pace. Follow the steps below to get better as quickly as possible. How can you care for yourself at home? Activity    If the doctor gave you a sedative: For 24 hours, don't do anything that requires attention to detail, such as going to work, making important decisions, or signing any legal documents. It takes time for the medicine's effects to completely wear off. For your safety, do not drive or operate any machinery that could be dangerous. Wait until the medicine wears off and you can think clearly and react easily. Do not do strenuous exercise and do not lift, pull, or push anything heavy until your doctor says it is okay. This may be for several days. You can walk around the house and do light activity, such as cooking. If the catheter was placed in your groin, try not to walk up stairs for the first couple of days. If the catheter was placed in your arm near your wrist, do not bend your wrist deeply for the first couple of days. Be careful using your hand to get into and out of a chair or bed.      Carry your stent identification card with you at all times. If your doctor recommends it, get more exercise. Walking is a good choice. Bit by bit, increase the amount you walk every day. Try for at least 30 minutes on most days of the week. If you haven't been set up with a cardiac rehab program, talk to your doctor about whether rehab is right for you. Cardiac rehab includes supervised exercise. It also includes help with diet and lifestyle changes and emotional support. Diet    Drink plenty of fluids to help your body flush out the dye. If you have kidney, heart, or liver disease and have to limit fluids, talk with your doctor before you increase the amount of fluids you drink. Keep eating a heart-healthy diet that has lots of fruits, vegetables, and whole grains. If you have not been eating this way, talk to your doctor. You also may want to talk to a dietitian. This expert can help you to learn about healthy foods and plan meals. Medicines    Your doctor will tell you if and when you can restart your medicines. Your doctor will also give you instructions about taking any new medicines. If you take aspirin or some other blood thinner, ask your doctor if and when to start taking it again. Make sure that you understand exactly what your doctor wants you to do. Your doctor will prescribe blood-thinning medicines. You will likely take aspirin plus another antiplatelet, such as clopidogrel (Plavix). It is very important that you take these medicines exactly as directed. These medicines help keep the coronary artery open and reduce your risk of a heart attack. Call your doctor if you think you are having a problem with your medicine. Care of the catheter site    For 1 or 2 days, keep a bandage over the spot where the catheter was inserted. The bandage probably will fall off in this time. Put ice or a cold pack on the area for 10 to 20 minutes at a time to help with soreness or swelling.  Put a thin cloth between the ice and your skin. You may shower 24 to 48 hours after the procedure, if your doctor okays it. Pat the incision dry. Do not soak the catheter site until it is healed. Don't take a bath for 1 week, or until your doctor tells you it is okay. Watch for bleeding from the site. A small amount of blood (up to the size of a quarter) on the bandage can be normal.     If you are bleeding, lie down and press on the area for 15 minutes to try to make it stop. If the bleeding does not stop, call your doctor or seek immediate medical care. Follow-up care is a key part of your treatment and safety. Be sure to make and go to all appointments, and call your doctor if you are having problems. It's also a good idea to know your test results and keep a list of the medicines you take. When should you call for help? Call 911 anytime you think you may need emergency care. For example, call if:    You passed out (lost consciousness). You have severe trouble breathing. You have sudden chest pain and shortness of breath, or you cough up blood. You have symptoms of a heart attack, such as:  Chest pain or pressure. Sweating. Shortness of breath. Nausea or vomiting. Pain that spreads from the chest to the neck, jaw, or one or both shoulders or arms. Dizziness or lightheadedness. A fast or uneven pulse. After calling 911, chew 1 adult-strength aspirin. Wait for an ambulance. Do not try to drive yourself. You have been diagnosed with angina, and you have angina symptoms that do not go away with rest or are not getting better within 5 minutes after you take one dose of nitroglycerin. Call your doctor now or seek immediate medical care if:    You are bleeding from the area where the catheter was put in your artery. You have a fast-growing, painful lump at the catheter site. You have signs of infection, such as: Increased pain, swelling, warmth, or redness. Red streaks leading from the catheter site.   Pus draining from the catheter site. A fever. Your leg or hand is painful, looks blue, or feels cold, numb, or tingly. Watch closely for changes in your health, and be sure to contact your doctor if you have any problems. Where can you learn more? Go to https://chpepiceweb.oDesk. org and sign in to your [employer] account. Enter C594 in the [employer] box to learn more about \"Coronary Angioplasty: What to Expect at Home. \"     If you do not have an account, please click on the \"Sign Up Now\" link. Current as of: April 29, 2021               Content Version: 13.1  © 7012-5299 Healthwise, Incorporated. Care instructions adapted under license by [facility] ([facility]). If you have questions about a medical condition or this instruction, always ask your healthcare professional. [address] any warranty or liability for your use of this information.

## 2022-02-26 NOTE — CARE COORDINATION
Spoke with pt to discuss discharge planning/transition of care. Pt lives with boyfriend, pt is independent, and plans on going home at discharge. Pt states she goes to [facility] in [place] and sees NP Spring, uses [facility] in [facility]. Pt states scripts were delivered to room last evening. Plan is home with boyfriend to transport. Awaiting morning labs, for possible discharge today. Dayna Queen, MSW, LSW

## 2022-02-26 NOTE — DISCHARGE INSTR - DIET
Good nutrition is important when healing from an illness, injury, or surgery. Follow any nutrition recommendations given to you during your hospital stay. If you were given an oral nutrition supplement while in the hospital, continue to take this supplement at home. You can take it with meals, in-between meals, and/or before bedtime. These supplements can be purchased at most local grocery stores, pharmacies, and chain super-stores. If you have any questions about your diet or nutrition, call the hospital and ask for the dietitian. Heart-Healthy Diet: Care Instructions  Your Care Instructions     A heart-healthy diet has lots of vegetables, fruits, nuts, beans, and whole grains, and is low in salt. It limits foods that are high in saturated fat, such as meats, cheeses, and fried foods. It may be hard to change your diet, but even small changes can lower your risk of heart attack and heart disease. Follow-up care is a key part of your treatment and safety. Be sure to make and go to all appointments, and call your doctor if you are having problems. It's also a good idea to know your test results and keep a list of the medicines you take. How can you care for yourself at home? Watch your portions  Use food labels to learn what the recommended servings are for the foods you eat. Eat only the number of calories you need to stay at a healthy weight. If you need to lose weight, eat fewer calories than your body burns (through exercise and other physical activity). Eat more fruits and vegetables  Eat a variety of fruit and vegetables every day. Dark green, deep orange, red, or yellow fruits and vegetables are especially good for you. Examples include spinach, carrots, peaches, and berries. Keep carrots, celery, and other veggies handy for snacks. Buy fruit that is in season and store it where you can see it so that you will be tempted to eat it.   Cook dishes that have a lot of veggies in them, such as stir-fries and soups. Limit saturated fat  Read food labels, and try to avoid saturated fats. They increase your risk of heart disease. Use olive or canola oil when you cook. Bake, broil, grill, or steam foods instead of frying them. Choose lean meats instead of high-fat meats such as hot dogs and sausages. Cut off all visible fat when you prepare meat. Eat fish, skinless poultry, and meat alternatives such as soy products instead of high-fat meats. Soy products, such as tofu, may be especially good for your heart. Choose low-fat or fat-free milk and dairy products. Eat foods high in fiber  Eat a variety of grain products every day. Include whole-grain foods that have lots of fiber and nutrients. Examples of whole-grain foods include oats, whole wheat bread, and brown rice. Buy whole-grain breads and cereals, instead of white bread or pastries. Limit salt and sodium  Limit how much salt and sodium you eat to help lower your blood pressure. Taste food before you salt it. Add only a little salt when you think you need it. With time, your taste buds will adjust to less salt. Eat fewer snack items, fast foods, and other high-salt, processed foods. Check food labels for the amount of sodium in packaged foods. Choose low-sodium versions of canned goods (such as soups, vegetables, and beans). Limit sugar  Limit drinks and foods with added sugar. These include candy, desserts, and soda pop. Limit alcohol  Limit alcohol to no more than 2 drinks a day for men and 1 drink a day for women. Too much alcohol can cause health problems. When should you call for help? Watch closely for changes in your health, and be sure to contact your doctor if:    You would like help planning heart-healthy meals. Where can you learn more? Go to https://radha.health-partners. org and sign in to your [employer] account. Enter V137 in the [facility] box to learn more about \"Heart-Healthy Diet: Care Instructions. \" If you do not have an account, please click on the \"Sign Up Now\" link. Current as of: September 8, 2021               Content Version: 13.1  © 2006-2021 Healthwise, Incorporated. Care instructions adapted under license by [facility] ([facility]). If you have questions about a medical condition or this instruction, always ask your healthcare professional. [address] any warranty or liability for your use of this information.

## 2022-02-26 NOTE — PROGRESS NOTES
Discharged to home. Goals met Discharged instructions given verbalized an understanding. Monitor removed and placed in nurses station.

## 2022-02-26 NOTE — DISCHARGE SUMMARY
Hospitalist Discharge Summary    Patient ID: Jj Monahan   Patient : [id number]  Patient's PCP: WARD Martinez CNP    Admit Date: 2022   Admitting Physician: No admitting provider for patient encounter. Discharge Date:  2022   Discharge Physician: Sammy Adame MD   Discharge Condition: Stable  Discharge Disposition: [facility] course in brief:  (Please refer to daily progress notes for a comprehensive review of the hospitalization by requesting medical records)    Ms. Jj Monahan, a 73y.o. year old female  who  has a past medical history of COPD (chronic obstructive pulmonary disease) ([address] and Hypertension. Transferred from [facility] for heart cath  NSTEMI. S/p PCI with drug-eluting stent in proximal and mid LAD and first diagonal branch on . As per Cardiology recommendation patient to be on DAPT for at least a year. Then ASA for life. Patient is aware of complications if early discontinuation of DAPT. Patient was asymptomatic and ready to discharge home however had hypokalemia that was treated over 24 hrs and discharged home with supplements and dietary recommendations. Patient was discharged to home on stable condition.  Follow up with PCP and cardiac rehab           Consults:   IP CONSULT TO CARDIAC REHAB    Discharge Diagnoses:    NSTEMI S/p PCI with drug-eluting stent in proximal and mid LAD and first on DAPT  Smoking       Discharge Instructions / Follow up:    Future Appointments   Date Time Provider Trang Silva   3/22/2022  1:00 PM DO DEAN Wall [facility]       Continued appropriate risk factor modification of blood pressure, diabetes and serum lipids will remain essential to reducing risk of future atherosclerotic development    Activity: activity as tolerated    Significant labs:  CBC:   Recent Labs     22  0636 22  0545   WBC 10.2 11.4   RBC 4.44 3.62   HGB 13.7 11.2*   HCT 41.0 34.0   MCV 92.3 93.9   RDW 13.2 13.4    191     BMP:   Recent Labs     02/24/22  0636 02/24/22  0636 02/25/22  0544 02/25/22  0554 02/25/22  1430 02/26/22  1039      < > 142  --  140 145   K 3.4*   < > 2.9*  --  3.0* 3.9      < > 107  --  107 112*   CO2 22   < > 23  --  22 22   BUN 10   < > 9  --  11 16   CREATININE 0.8   < > 0.8  --  1.0 0.9   MG 1.7  --   --  1.7  --   --     < > = values in this interval not displayed. LFT:  Recent Labs     02/24/22 0636   PROT 6.5   ALKPHOS 102   ALT 34*   AST 31   BILITOT 0.5     PT/INR:   Recent Labs     02/24/22 0636   APTT 29.3     BNP: No results for input(s): BNP in the last 72 hours. Hgb A1C:   Lab Results   Component Value Date    LABA1C 8.8 (H) 02/24/2022     Folate and B12: No results found for: [place], No results found for: FOLATE  Thyroid Studies:   Lab Results   Component Value Date    TSH 0.891 02/24/2022       Urinalysis:  No results found for: NITRU, WBCUA, BACTERIA, RBCUA, BLOODU, SPECGRAV, GLUCOSEU    Imaging:  XR CHEST PORTABLE    Result Date: 2/24/2022  EXAMINATION: ONE XRAY VIEW OF THE CHEST 2/24/2022 7:36 am COMPARISON: 08/16/2013 HISTORY: ORDERING SYSTEM PROVIDED HISTORY: chest pain TECHNOLOGIST PROVIDED HISTORY: Reason for exam:->chest pain FINDINGS: The lungs are without acute focal process. There is no effusion or pneumothorax. The cardiomediastinal silhouette is without acute process. The osseous structures are without acute process. No acute process.        Discharge Medications:      Medication List      START taking these medications    aspirin EC 81 MG EC tablet  Take 1 tablet by mouth daily     atorvastatin 80 MG tablet  Commonly known as: LIPITOR  Take 1 tablet by mouth nightly     clopidogrel 75 MG tablet  Commonly known as: PLAVIX  Take 1 tablet by mouth daily     losartan 50 MG tablet  Commonly known as: COZAAR  Take 1 tablet by mouth daily     metoprolol succinate 25 MG extended release tablet  Commonly known as: TOPROL XL  Take 1 tablet by mouth daily     pantoprazole 40 MG tablet  Commonly known as: PROTONIX  Take 1 tablet by mouth every morning (before breakfast)  Replaces: omeprazole 20 MG delayed release capsule     potassium chloride 20 MEQ extended release tablet  Commonly known as: KLOR-CON M  Take 1 tablet by mouth daily for 1 day  Start taking on: February 27, 2022        CONTINUE taking these medications    Incruse Ellipta 62.5 MCG/INH Aepb  Generic drug: Umeclidinium Bromide     ipratropium-albuterol 0.5-2.5 (3) MG/3ML Soln nebulizer solution  Commonly known as: DUONEB     montelukast 10 MG tablet  Commonly known as: SINGULAIR     Symbicort 160-4.5 MCG/ACT Aero  Generic drug: budesonide-formoterol     Ventolin  (90 Base) MCG/ACT inhaler  Generic drug: albuterol sulfate HFA        STOP taking these medications    losartan-hydroCHLOROthiazide 50-12.5 MG per tablet  Commonly known as: HYZAAR     omeprazole 20 MG delayed release capsule  Commonly known as: PRILOSEC  Replaced by: pantoprazole 40 MG tablet        Medication Instructions:                Where to Get Your Medications      These medications were sent to [address] [place] [address] [phone]    Phone: [phone]   · potassium chloride 20 MEQ extended release tablet     These medications were sent to [facility] Her[facility] "[address] [facility] [phone]  [address] [place] [address]    Phone: [phone]   · aspirin EC 81 MG EC tablet  · atorvastatin 80 MG tablet  · clopidogrel 75 MG tablet  · losartan 50 MG tablet  · metoprolol succinate 25 MG extended release tablet  · pantoprazole 40 MG tablet         Time Spent on discharge is more than 45 minutes in the examination, evaluation, counseling and review of medications and discharge plan.    +++++++++++++++++++++++++++++++++++++++++++++++++  MD Zoila Childress Physician - [facility], [place]  +++++++++++++++++++++++++++++++++++++++++++++++++  NOTE: This report was transcribed using voice recognition software. Every effort was made to ensure accuracy; however, inadvertent computerized transcription errors may be present.

## 2022-03-18 RX ORDER — HYDROCHLOROTHIAZIDE 25 MG/1
1 TABLET ORAL DAILY
COMMUNITY
End: 2022-03-22

## 2022-03-21 RX ORDER — RANITIDINE 150 MG/1
1 TABLET ORAL NIGHTLY
COMMUNITY
End: 2022-03-22

## 2022-03-21 RX ORDER — MELOXICAM 7.5 MG/1
1 TABLET ORAL DAILY
COMMUNITY
End: 2022-03-22

## 2022-03-21 RX ORDER — BUDESONIDE AND FORMOTEROL FUMARATE DIHYDRATE 160; 4.5 UG/1; UG/1
AEROSOL RESPIRATORY (INHALATION)
COMMUNITY
End: 2022-03-22

## 2022-03-21 RX ORDER — OMEPRAZOLE 20 MG/1
2 CAPSULE, DELAYED RELEASE ORAL DAILY
COMMUNITY
End: 2022-03-22

## 2022-03-21 RX ORDER — LOSARTAN POTASSIUM AND HYDROCHLOROTHIAZIDE 12.5; 5 MG/1; MG/1
1 TABLET ORAL DAILY
COMMUNITY
Start: 2022-02-28 | End: 2022-03-22

## 2022-03-21 RX ORDER — PREDNISONE 20 MG/1
1 TABLET ORAL DAILY
COMMUNITY
Start: 2022-01-16 | End: 2022-05-31

## 2022-03-21 RX ORDER — FLUTICASONE FUROATE AND VILANTEROL TRIFENATATE 100; 25 UG/1; UG/1
POWDER RESPIRATORY (INHALATION)
COMMUNITY

## 2022-03-22 ENCOUNTER — OFFICE VISIT (OUTPATIENT)
Dept: CARDIOLOGY CLINIC | Age: 67
End: 2022-03-22
Payer: MEDICARE

## 2022-03-22 VITALS
RESPIRATION RATE: 18 BRPM | HEART RATE: 67 BPM | HEIGHT: 64 IN | BODY MASS INDEX: 35.68 KG/M2 | WEIGHT: 209 LBS | DIASTOLIC BLOOD PRESSURE: 78 MMHG | SYSTOLIC BLOOD PRESSURE: 148 MMHG

## 2022-03-22 DIAGNOSIS — I21.4 NSTEMI (NON-ST ELEVATED MYOCARDIAL INFARCTION) (HCC): Primary | ICD-10-CM

## 2022-03-22 PROCEDURE — G8400 PT W/DXA NO RESULTS DOC: HCPCS | Performed by: INTERNAL MEDICINE

## 2022-03-22 PROCEDURE — 3017F COLORECTAL CA SCREEN DOC REV: CPT | Performed by: INTERNAL MEDICINE

## 2022-03-22 PROCEDURE — 99214 OFFICE O/P EST MOD 30 MIN: CPT | Performed by: INTERNAL MEDICINE

## 2022-03-22 PROCEDURE — 1123F ACP DISCUSS/DSCN MKR DOCD: CPT | Performed by: INTERNAL MEDICINE

## 2022-03-22 PROCEDURE — 1090F PRES/ABSN URINE INCON ASSESS: CPT | Performed by: INTERNAL MEDICINE

## 2022-03-22 PROCEDURE — G8417 CALC BMI ABV UP PARAM F/U: HCPCS | Performed by: INTERNAL MEDICINE

## 2022-03-22 PROCEDURE — 1111F DSCHRG MED/CURRENT MED MERGE: CPT | Performed by: INTERNAL MEDICINE

## 2022-03-22 PROCEDURE — G8427 DOCREV CUR MEDS BY ELIG CLIN: HCPCS | Performed by: INTERNAL MEDICINE

## 2022-03-22 PROCEDURE — 1036F TOBACCO NON-USER: CPT | Performed by: INTERNAL MEDICINE

## 2022-03-22 PROCEDURE — 93000 ELECTROCARDIOGRAM COMPLETE: CPT | Performed by: INTERNAL MEDICINE

## 2022-03-22 PROCEDURE — 4040F PNEUMOC VAC/ADMIN/RCVD: CPT | Performed by: INTERNAL MEDICINE

## 2022-03-22 PROCEDURE — G8484 FLU IMMUNIZE NO ADMIN: HCPCS | Performed by: INTERNAL MEDICINE

## 2022-03-22 RX ORDER — LOSARTAN POTASSIUM 25 MG/1
25 TABLET ORAL DAILY
Qty: 90 TABLET | Refills: 3 | Status: SHIPPED
Start: 2022-03-22 | End: 2022-05-31 | Stop reason: DRUGHIGH

## 2022-03-22 RX ORDER — HYDROCHLOROTHIAZIDE 25 MG/1
25 TABLET ORAL EVERY MORNING
Qty: 90 TABLET | Refills: 3 | Status: SHIPPED | OUTPATIENT
Start: 2022-03-22

## 2022-03-22 NOTE — PROGRESS NOTES
CHIEF COMPLAINT: CAD    HISTORY OF PRESENT ILLNESS: Patient is a 73 y.o. female seen at the request of WARD Greenwood CNP. Some chest tightness, SOB and edema. Past Medical History:   Diagnosis Date    COPD (chronic obstructive pulmonary disease) ([facility] [address]     Hypertension        Patient Active Problem List   Diagnosis    [facility]non-[facility]) [facility]    CAD in native artery       No Known Allergies    Current Outpatient Medications   Medication Sig Dispense Refill    predniSONE (DELTASONE) 20 MG tablet Take 1 tablet by mouth daily      fluticasone-vilanterol (BREO ELLIPTA) 100-25 MCG/INH AEPB inhaler Breo Ellipta 100 mcg-25 mcg/dose powder for inhalation   USE 1 PUFF DAILY      atorvastatin (LIPITOR) 80 MG tablet Take 1 tablet by mouth nightly 90 tablet 3    clopidogrel (PLAVIX) 75 MG tablet Take 1 tablet by mouth daily 90 tablet 3    metoprolol succinate (TOPROL XL) 25 MG extended release tablet Take 1 tablet by mouth daily 90 tablet 3    pantoprazole (PROTONIX) 40 MG tablet Take 1 tablet by mouth every morning (before breakfast) 90 tablet 3    aspirin EC 81 MG EC tablet Take 1 tablet by mouth daily 180 tablet 3    Umeclidinium Bromide (INCRUSE ELLIPTA) 62.5 MCG/INH AEPB Inhale 1 puff into the lungs daily as needed (SOB/WHEEZING)      ipratropium-albuterol (DUONEB) 0.5-2.5 (3) MG/3ML SOLN nebulizer solution Inhale 1 vial into the lungs every 4 hours as needed for Shortness of Breath      montelukast (SINGULAIR) 10 MG tablet Take 10 mg by mouth nightly      albuterol sulfate HFA (VENTOLIN HFA) 108 (90 Base) MCG/ACT inhaler Inhale 2 puffs into the lungs every 4 hours as needed for Wheezing or Shortness of Breath      budesonide-formoterol (SYMBICORT) 160-4.5 MCG/ACT AERO Inhale 2 puffs into the lungs 2 times daily       No current facility-administered medications for this visit.        Social History     Socioeconomic History    Marital status:  Spouse name: Not on file    Number of children: Not on file    Years of education: Not on file    Highest education level: Not on file   Occupational History    Not on file   Tobacco Use    Smoking status: Former Smoker     Packs/day: 0.25     Types: Cigarettes     Quit date: 2022     Years since quittin.0    Smokeless tobacco: Never Used   Vaping Use    Vaping Use: Never used   Substance and Sexual Activity    Alcohol use: Not Currently    Drug use: Not Currently    Sexual activity: Not on file   Other Topics Concern    Not on file   Social History Narrative    Not on file     Social Determinants of Health     Financial Resource Strain:     Difficulty of Paying Living Expenses: Not on file   Food Insecurity:     Worried About [address] in the Last Year: Not on file    Kristyn of Food in the Last Year: Not on file   Transportation Needs:     Lack of Transportation (Medical): Not on file    Lack of Transportation (Non-Medical):  Not on file   Physical Activity:     Days of Exercise per Week: Not on file    Minutes of Exercise per Session: Not on file   Stress:     Feeling of Stress : Not on file   Social Connections:     Frequency of Communication with Friends and Family: Not on file    Frequency of Social Gatherings with Friends and Family: Not on file    Attends Protestant Services: Not on file    Active Member of [address] [place] [address] or Organizations: Not on file    Attends Club or Organization Meetings: Not on file    Marital Status: Not on file   Intimate Partner Violence:     Fear of Current or Ex-Partner: Not on file    Emotionally Abused: Not on file    Physically Abused: Not on file    Sexually Abused: Not on file   Housing Stability:     Unable to Pay for Housing in the Last Year: Not on file    Number of [place] in the Last Year: Not on file    Unstable Housing in the Last Year: Not on file       Family History   Problem Relation Age of Onset    Heart Attack Mother     Heart Surgery Father     Heart Attack Father        Review of Systems:  Heart: as above   Lungs: as above   Eyes: denies changes in vision or discharge. Ears: denies changes in hearing or pain. Nose: denies epistaxis or masses   Throat: denies sore throat or trouble swallowing. Neuro: denies numbness, tingling, tremors. Skin: denies rashes or itching. : denies hematuria, dysuria   GI: denies vomiting, diarrhea   Psych: denies mood changed, anxiety, depression. All other systems negative. Physical Exam   BP (!) 148/78   Pulse 67   Resp 18   Ht 5' 4\" (1.626 m)   Wt 209 lb (94.8 kg)   BMI 35.87 kg/m²   Constitutional: Oriented to person, place, and time. Well-developed and well-nourished. No distress. Head: Normocephalic and atraumatic. Eyes: EOM are normal. Pupils are equal, round, and reactive to light. Neck: Normal range of motion. Neck supple. No hepatojugular reflux and no JVD present. Carotid bruit is not present. No tracheal deviation present. No thyromegaly present. Cardiovascular: Normal rate, regular rhythm, normal heart sounds and intact distal pulses. Exam reveals no gallop and no friction rub. No murmur heard. Pulmonary/Chest: Effort normal and breath sounds normal. No respiratory distress. No wheezes. No rales. No tenderness. Abdominal: Soft. Bowel sounds are normal. No distension and no mass. No tenderness. No rebound and no guarding. Musculoskeletal: Normal range of motion. No edema and no tenderness. Lymphadenopathy:   No cervical adenopathy. No groin adenopathy. Neurological: Alert and oriented to person, place, and time. Skin: Skin is warm and dry. No rash noted. Not diaphoretic. No erythema. Psychiatric: Normal mood and affect. Behavior is normal.     EKG personally reviewed 03/22/22:  normal sinus rhythm, nonspecific ST and T waves changes. CATH SUMMARY 02/24/2022:   PROCEDURES:  1. Coronary angiography.   2.  Percutaneous coronary intervention with deployment of 3.0 x 18 mm [place] Resolute drug-eluting stent in proximal and mid LAD. 3.  Deployment of 3.0 x 26 mm [place] Resolute drug-eluting stent in first diagonal branch. 4.  Aspiration thrombectomy of first diagonal branch. 5.  Conscious sedation using Versed and fentanyl. ANGIOGRAPHIC FINDINGS:  The left main coronary artery arises normally from the left sinus of Valsalva. It is relatively short vessel, good-sized vessel without any disease. It bifurcates into left anterior descending artery and left circumflex artery. The left anterior descending artery is a large vessel extends down to the apex. It has 20% disease just distal to the take off a large diagonal branch. The first diagonal branch is a large vessel with 90% proximal stenosis with a large thrombus in the proximal and mid segment with NIKKI-2 flow distally. The left circumflex artery is a large vessel, gives off two large OM branches. The left circumflex artery and its branches have no CAD.   The right coronary artery arises normally from the right sinus of Valsalva. It is a large vessel, good-sized vessel, dominant circulation with 20% disease in the mid segment. IMPRESSION:  1. Acute myocardial infarction (non-ST-elevation MI with persistent chest pain), status post PCI to diagonal branch (pre-PCI NIKKI-2 flow, post-PCI NIKKI-3 flow; pre-PCI stenosis 90%, post-PCI stenosis was 0%). 2.  Percutaneous coronary intervention of proximal and mid LAD to treat dissection of the proximal LAD (pre-PCI NIKKI-3 flow, post-PCI NIKKI-3 flow; pre-PCI stenosis was 0%, post-PCI stenosis was 0%). 3.  Aspiration thrombectomy of proximal and mid first diagonal branch. 4.  Mild 20% involving the mid LAD that was left to be treated medically. 5.  Mild disease involving the RCA. RECOMMENDATIONS:  1. Aspirin for life. 2.  P2Y12 inhibitors for at least a year, preferably longer.   3.  Aggressive coronary artery disease risk factor modifications. 4.  Smoking cessation. ASSESSMENT AND PLAN:  Patient Active Problem List   Diagnosis    [facility]STEM[facility] (non-ST elevated [facility] infarction) [facility]    CAD in native artery     1. NSTEMI/CAD/PCI to LAD and Diag:    Some SOB and edema. Start ARB and HCTZ. ASA/statin/BB/plavix. 2. HTN: Observe. 3. COPD    4. DM    5. Tobacco use    6. Probable EDMUNDO    Desi Bill D.O.   Cardiologist  Cardiology, [facility]

## 2022-03-30 ENCOUNTER — TELEPHONE (OUTPATIENT)
Dept: CARDIOLOGY CLINIC | Age: 67
End: 2022-03-30

## 2022-05-31 ENCOUNTER — OFFICE VISIT (OUTPATIENT)
Dept: CARDIOLOGY CLINIC | Age: 67
End: 2022-05-31
Payer: MEDICARE

## 2022-05-31 VITALS
HEART RATE: 72 BPM | HEIGHT: 64 IN | WEIGHT: 194 LBS | BODY MASS INDEX: 33.12 KG/M2 | SYSTOLIC BLOOD PRESSURE: 134 MMHG | RESPIRATION RATE: 18 BRPM | DIASTOLIC BLOOD PRESSURE: 76 MMHG

## 2022-05-31 DIAGNOSIS — I21.4 NSTEMI (NON-ST ELEVATED MYOCARDIAL INFARCTION) (HCC): Primary | ICD-10-CM

## 2022-05-31 PROCEDURE — 3017F COLORECTAL CA SCREEN DOC REV: CPT | Performed by: INTERNAL MEDICINE

## 2022-05-31 PROCEDURE — 1036F TOBACCO NON-USER: CPT | Performed by: INTERNAL MEDICINE

## 2022-05-31 PROCEDURE — 1123F ACP DISCUSS/DSCN MKR DOCD: CPT | Performed by: INTERNAL MEDICINE

## 2022-05-31 PROCEDURE — G8400 PT W/DXA NO RESULTS DOC: HCPCS | Performed by: INTERNAL MEDICINE

## 2022-05-31 PROCEDURE — G8417 CALC BMI ABV UP PARAM F/U: HCPCS | Performed by: INTERNAL MEDICINE

## 2022-05-31 PROCEDURE — 1090F PRES/ABSN URINE INCON ASSESS: CPT | Performed by: INTERNAL MEDICINE

## 2022-05-31 PROCEDURE — 99214 OFFICE O/P EST MOD 30 MIN: CPT | Performed by: INTERNAL MEDICINE

## 2022-05-31 PROCEDURE — 93000 ELECTROCARDIOGRAM COMPLETE: CPT | Performed by: INTERNAL MEDICINE

## 2022-05-31 PROCEDURE — G8427 DOCREV CUR MEDS BY ELIG CLIN: HCPCS | Performed by: INTERNAL MEDICINE

## 2022-05-31 RX ORDER — ATORVASTATIN CALCIUM 80 MG/1
80 TABLET, FILM COATED ORAL NIGHTLY
Qty: 90 TABLET | Refills: 3 | Status: SHIPPED | OUTPATIENT
Start: 2022-05-31

## 2022-05-31 RX ORDER — DAPAGLIFLOZIN 10 MG/1
TABLET, FILM COATED ORAL
COMMUNITY
Start: 2022-03-28

## 2022-05-31 RX ORDER — DULOXETIN HYDROCHLORIDE 30 MG/1
CAPSULE, DELAYED RELEASE ORAL
COMMUNITY
Start: 2022-03-28

## 2022-05-31 RX ORDER — LOSARTAN POTASSIUM 50 MG/1
TABLET ORAL
COMMUNITY
Start: 2022-05-23 | End: 2022-05-31 | Stop reason: SDUPTHER

## 2022-05-31 RX ORDER — METOPROLOL SUCCINATE 25 MG/1
25 TABLET, EXTENDED RELEASE ORAL DAILY
Qty: 90 TABLET | Refills: 3 | Status: SHIPPED | OUTPATIENT
Start: 2022-05-31

## 2022-05-31 RX ORDER — LOSARTAN POTASSIUM 50 MG/1
TABLET ORAL
Qty: 90 TABLET | Refills: 3 | Status: SHIPPED | OUTPATIENT
Start: 2022-05-31

## 2022-05-31 RX ORDER — CLOPIDOGREL BISULFATE 75 MG/1
75 TABLET ORAL DAILY
Qty: 90 TABLET | Refills: 3 | Status: SHIPPED | OUTPATIENT
Start: 2022-05-31

## 2022-05-31 NOTE — PROGRESS NOTES
MCG/ACT inhaler Inhale 2 puffs into the lungs every 4 hours as needed for Wheezing or Shortness of Breath       No current facility-administered medications for this visit. Social History     Socioeconomic History    Marital status:      Spouse name: Not on file    Number of children: Not on file    Years of education: Not on file    Highest education level: Not on file   Occupational History    Not on file   Tobacco Use    Smoking status: Former Smoker     Packs/day: 0.25     Types: Cigarettes     Quit date: 2022     Years since quittin.2    Smokeless tobacco: Never Used   Vaping Use    Vaping Use: Never used   Substance and Sexual Activity    Alcohol use: Not Currently    Drug use: Not Currently    Sexual activity: Not on file   Other Topics Concern    Not on file   Social History Narrative    Not on file     Social Determinants of Health     Financial Resource Strain:     Difficulty of Paying Living Expenses: Not on file   Food Insecurity:     Worried About [address] in the Last Year: Not on file    Kristyn of Food in the Last Year: Not on file   Transportation Needs:     Lack of Transportation (Medical): Not on file    Lack of Transportation (Non-Medical):  Not on file   Physical Activity:     Days of Exercise per Week: Not on file    Minutes of Exercise per Session: Not on file   Stress:     Feeling of Stress : Not on file   Social Connections:     Frequency of Communication with Friends and Family: Not on file    Frequency of Social Gatherings with Friends and Family: Not on file    Attends Gnosticism Services: Not on file    Active Member of Clubs or Organizations: Not on file    Attends Club or Organization Meetings: Not on file    Marital Status: Not on file   Intimate Partner Violence:     Fear of Current or Ex-Partner: Not on file    Emotionally Abused: Not on file    Physically Abused: Not on file    Sexually Abused: Not on file   Housing Stability:     Unable to Pay for Housing in the Last Year: Not on file    Number of Places Lived in the Last Year: Not on file    Unstable Housing in the Last Year: Not on file       Family History   Problem Relation Age of Onset    Heart Attack Mother     Heart Surgery Father     Heart Attack Father        Review of Systems:  Heart: as above   Lungs: as above   Eyes: denies changes in vision or discharge. Ears: denies changes in hearing or pain. Nose: denies epistaxis or masses   Throat: denies sore throat or trouble swallowing. Neuro: denies numbness, tingling, tremors. Skin: denies rashes or itching. : denies hematuria, dysuria   GI: denies vomiting, diarrhea   Psych: denies mood changed, anxiety, depression. All other systems negative. Physical Exam   /76   Pulse 72   Resp 18   Ht 5' 4\" (1.626 m)   Wt 194 lb (88 kg)   BMI 33.30 kg/m²   Constitutional: Oriented to person, place, and time. Well-developed and well-nourished. No distress. Head: Normocephalic and atraumatic. Eyes: EOM are normal. Pupils are equal, round, and reactive to light. Neck: Normal range of motion. Neck supple. No hepatojugular reflux and no JVD present. Carotid bruit is not present. No tracheal deviation present. No thyromegaly present. Cardiovascular: Normal rate, regular rhythm, normal heart sounds and intact distal pulses. Exam reveals no gallop and no friction rub. No murmur heard. Pulmonary/Chest: Effort normal and breath sounds normal. No respiratory distress. No wheezes. No rales. No tenderness. Abdominal: Soft. Bowel sounds are normal. No distension and no mass. No tenderness. No rebound and no guarding. Musculoskeletal: Normal range of motion. No edema and no tenderness. Lymphadenopathy:   No cervical adenopathy. No groin adenopathy. Neurological: Alert and oriented to person, place, and time. Skin: Skin is warm and dry. No rash noted. Not diaphoretic. No erythema.    Psychiatric: Normal mood and affect. Behavior is normal.     EKG personally reviewed 05/31/22:  normal sinus rhythm, nonspecific ST and T waves changes. CATH SUMMARY 02/24/2022:   PROCEDURES:  1. Coronary angiography. 2.  Percutaneous coronary intervention with deployment of 3.0 x 18 mm [place] Resolute drug-eluting stent in proximal and mid LAD. 3.  Deployment of 3.0 x 26 mm [place] Resolute drug-eluting stent in first diagonal branch. 4.  Aspiration thrombectomy of first diagonal branch. 5.  Conscious sedation using Versed and fentanyl. ANGIOGRAPHIC FINDINGS:  The left main coronary artery arises normally from the left sinus of Valsalva. It is relatively short vessel, good-sized vessel without any disease. It bifurcates into left anterior descending artery and left circumflex artery. The left anterior descending artery is a large vessel extends down to the apex. It has 20% disease just distal to the take off a large diagonal branch. The first diagonal branch is a large vessel with 90% proximal stenosis with a large thrombus in the proximal and mid segment with NIKKI-2 flow distally. The left circumflex artery is a large vessel, gives off two large OM branches. The left circumflex artery and its branches have no CAD.   The right coronary artery arises normally from the right sinus of Valsalva. It is a large vessel, good-sized vessel, dominant circulation with 20% disease in the mid segment. IMPRESSION:  1. Acute myocardial infarction (non-ST-elevation MI with persistent chest pain), status post PCI to diagonal branch (pre-PCI NIKKI-2 flow, post-PCI NIKKI-3 flow; pre-PCI stenosis 90%, post-PCI stenosis was 0%). 2.  Percutaneous coronary intervention of proximal and mid LAD to treat dissection of the proximal LAD (pre-PCI NIKKI-3 flow, post-PCI NIKKI-3 flow; pre-PCI stenosis was 0%, post-PCI stenosis was 0%). 3.  Aspiration thrombectomy of proximal and mid first diagonal branch.   4.  Mild 20% involving the mid LAD that was left to be treated medically. 5.  Mild disease involving the RCA. RECOMMENDATIONS:  1. Aspirin for life. 2.  P2Y12 inhibitors for at least a year, preferably longer. 3.  Aggressive coronary artery disease risk factor modifications. 4.  Smoking cessation. ASSESSMENT AND PLAN:  Patient Active Problem List   Diagnosis    [facility]STEM[facility] (non-ST elevated [facility] infarction) [facility]    CAD in native artery     1. NSTEMI/CAD/PCI to LAD and Diag:    ASA/statin/BB/ARB/HCTZ/plavix. 2. HTN: Observe. 3. COPD    4. DM    5. Tobacco use    6. Probable EDMUNDO    Cherelle Griffin D.O.   Cardiologist  Cardiology, [facility]

## 2022-07-09 ENCOUNTER — APPOINTMENT (OUTPATIENT)
Dept: GENERAL RADIOLOGY | Age: 67
End: 2022-07-09
Payer: MEDICARE

## 2022-07-09 ENCOUNTER — HOSPITAL ENCOUNTER (EMERGENCY)
Age: 67
Discharge: HOME | End: 2022-07-09
Attending: EMERGENCY MEDICINE
Payer: MEDICARE

## 2022-07-09 ENCOUNTER — APPOINTMENT (OUTPATIENT)
Dept: CT IMAGING | Age: 67
End: 2022-07-09
Payer: MEDICARE

## 2022-07-09 VITALS
OXYGEN SATURATION: 98 % | DIASTOLIC BLOOD PRESSURE: 68 MMHG | BODY MASS INDEX: 31.24 KG/M2 | TEMPERATURE: 97.5 F | HEART RATE: 81 BPM | RESPIRATION RATE: 19 BRPM | HEIGHT: 64 IN | SYSTOLIC BLOOD PRESSURE: 126 MMHG | WEIGHT: 183 LBS

## 2022-07-09 DIAGNOSIS — R07.9 CHEST PAIN, UNSPECIFIED TYPE: Primary | ICD-10-CM

## 2022-07-09 LAB
ANION GAP SERPL CALCULATED.3IONS-SCNC: 15 MMOL/L (ref 7–16)
BASOPHILS ABSOLUTE: 0.03 E9/L (ref 0–0.2)
BASOPHILS RELATIVE PERCENT: 0.5 % (ref 0–2)
BUN BLDV-MCNC: 23 MG/DL (ref 6–23)
CALCIUM SERPL-MCNC: 8.4 MG/DL (ref 8.6–10.2)
CHLORIDE BLD-SCNC: 105 MMOL/L (ref 98–107)
CO2: 21 MMOL/L (ref 22–29)
CREAT SERPL-MCNC: 1.4 MG/DL (ref 0.5–1)
EOSINOPHILS ABSOLUTE: 0.05 E9/L (ref 0.05–0.5)
EOSINOPHILS RELATIVE PERCENT: 0.9 % (ref 0–6)
GFR AFRICAN AMERICAN: 46
GFR NON-AFRICAN AMERICAN: 38 ML/MIN/1.73
GLUCOSE BLD-MCNC: 128 MG/DL (ref 74–99)
HCT VFR BLD CALC: 38.2 % (ref 34–48)
HEMOGLOBIN: 12.5 G/DL (ref 11.5–15.5)
IMMATURE GRANULOCYTES #: 0.03 E9/L
IMMATURE GRANULOCYTES %: 0.5 % (ref 0–5)
LYMPHOCYTES ABSOLUTE: 1.63 E9/L (ref 1.5–4)
LYMPHOCYTES RELATIVE PERCENT: 28.2 % (ref 20–42)
MCH RBC QN AUTO: 29.4 PG (ref 26–35)
MCHC RBC AUTO-ENTMCNC: 32.7 % (ref 32–34.5)
MCV RBC AUTO: 89.9 FL (ref 80–99.9)
MONOCYTES ABSOLUTE: 0.75 E9/L (ref 0.1–0.95)
MONOCYTES RELATIVE PERCENT: 13 % (ref 2–12)
NEUTROPHILS ABSOLUTE: 3.28 E9/L (ref 1.8–7.3)
NEUTROPHILS RELATIVE PERCENT: 56.9 % (ref 43–80)
PDW BLD-RTO: 14.3 FL (ref 11.5–15)
PLATELET # BLD: 180 E9/L (ref 130–450)
PMV BLD AUTO: 11.1 FL (ref 7–12)
POTASSIUM REFLEX MAGNESIUM: 3.6 MMOL/L (ref 3.5–5)
PRO-BNP: 163 PG/ML (ref 0–125)
RBC # BLD: 4.25 E12/L (ref 3.5–5.5)
REASON FOR REJECTION: NORMAL
REASON FOR REJECTION: NORMAL
REJECTED TEST: NORMAL
REJECTED TEST: NORMAL
SODIUM BLD-SCNC: 141 MMOL/L (ref 132–146)
TROPONIN, HIGH SENSITIVITY: 17 NG/L (ref 0–9)
TROPONIN, HIGH SENSITIVITY: 18 NG/L (ref 0–9)
WBC # BLD: 5.8 E9/L (ref 4.5–11.5)

## 2022-07-09 PROCEDURE — 93005 ELECTROCARDIOGRAM TRACING: CPT | Performed by: STUDENT IN AN ORGANIZED HEALTH CARE EDUCATION/TRAINING PROGRAM

## 2022-07-09 PROCEDURE — 6360000002 HC RX W HCPCS: Performed by: STUDENT IN AN ORGANIZED HEALTH CARE EDUCATION/TRAINING PROGRAM

## 2022-07-09 PROCEDURE — 80048 BASIC METABOLIC PNL TOTAL CA: CPT

## 2022-07-09 PROCEDURE — 85025 COMPLETE CBC W/AUTO DIFF WBC: CPT

## 2022-07-09 PROCEDURE — 71275 CT ANGIOGRAPHY CHEST: CPT

## 2022-07-09 PROCEDURE — 99285 EMERGENCY DEPT VISIT HI MDM: CPT

## 2022-07-09 PROCEDURE — 6360000004 HC RX CONTRAST MEDICATION: Performed by: RADIOLOGY

## 2022-07-09 PROCEDURE — 96374 THER/PROPH/DIAG INJ IV PUSH: CPT

## 2022-07-09 PROCEDURE — 74174 CTA ABD&PLVS W/CONTRAST: CPT

## 2022-07-09 PROCEDURE — 83880 ASSAY OF NATRIURETIC PEPTIDE: CPT

## 2022-07-09 PROCEDURE — 84484 ASSAY OF TROPONIN QUANT: CPT

## 2022-07-09 PROCEDURE — 96375 TX/PRO/DX INJ NEW DRUG ADDON: CPT

## 2022-07-09 PROCEDURE — 2580000003 HC RX 258: Performed by: STUDENT IN AN ORGANIZED HEALTH CARE EDUCATION/TRAINING PROGRAM

## 2022-07-09 PROCEDURE — 71045 X-RAY EXAM CHEST 1 VIEW: CPT

## 2022-07-09 RX ORDER — SODIUM CHLORIDE 0.9 % (FLUSH) 0.9 %
10 SYRINGE (ML) INJECTION PRN
Status: DISCONTINUED | OUTPATIENT
Start: 2022-07-09 | End: 2022-07-10 | Stop reason: HOSPADM

## 2022-07-09 RX ORDER — FENTANYL CITRATE 50 UG/ML
50 INJECTION, SOLUTION INTRAMUSCULAR; INTRAVENOUS ONCE
Status: COMPLETED | OUTPATIENT
Start: 2022-07-09 | End: 2022-07-09

## 2022-07-09 RX ORDER — 0.9 % SODIUM CHLORIDE 0.9 %
1000 INTRAVENOUS SOLUTION INTRAVENOUS ONCE
Status: COMPLETED | OUTPATIENT
Start: 2022-07-09 | End: 2022-07-09

## 2022-07-09 RX ORDER — ONDANSETRON 2 MG/ML
4 INJECTION INTRAMUSCULAR; INTRAVENOUS ONCE
Status: COMPLETED | OUTPATIENT
Start: 2022-07-09 | End: 2022-07-09

## 2022-07-09 RX ADMIN — SODIUM CHLORIDE 1000 ML: 9 INJECTION, SOLUTION INTRAVENOUS at 16:06

## 2022-07-09 RX ADMIN — IOPAMIDOL 90 ML: 755 INJECTION, SOLUTION INTRAVENOUS at 21:52

## 2022-07-09 RX ADMIN — FENTANYL CITRATE 50 MCG: 50 INJECTION, SOLUTION INTRAMUSCULAR; INTRAVENOUS at 16:13

## 2022-07-09 RX ADMIN — ONDANSETRON 4 MG: 2 INJECTION INTRAMUSCULAR; INTRAVENOUS at 16:13

## 2022-07-09 ASSESSMENT — PAIN SCALES - GENERAL: PAINLEVEL_OUTOF10: 9

## 2022-07-09 ASSESSMENT — PAIN - FUNCTIONAL ASSESSMENT: PAIN_FUNCTIONAL_ASSESSMENT: NONE - DENIES PAIN

## 2022-07-09 ASSESSMENT — PAIN DESCRIPTION - ORIENTATION: ORIENTATION: RIGHT

## 2022-07-09 ASSESSMENT — PAIN DESCRIPTION - LOCATION: LOCATION: BACK;ARM;NECK

## 2022-07-09 NOTE — ED PROVIDER NOTES
Department of Emergency Medicine   ED  Provider Note  Admit Date/RoomTime: 7/9/2022  3:51 PM  ED Room: 07/07                HPI:  7/9/22, Time: 4:04 PM EDT  . Adiel Rojas is a 78 y.o. female presenting to the ED for chest pain, beginning 13 hrs ago ago. The complaint has been constant, moderate in severity, and worsened by nothing. Patient been having chest pain since 130 this morning. Nothing makes it better or worse and located in the middle of her chest does radiate down her right arm. Described as a pressure sensation. Feels similar to previous heart attack. Does have a history of hypertension, high cholesterol and smoking history as well as COPD. Does have previous history 2 stents placed back in February of this year. Does elicit have some nausea and diaphoresis with it. Denies any abdominal pain change bowel bladder habits fevers or chills. No other acute complaints. Review of Systems:   Pertinent positives and negatives are stated within HPI, all other systems reviewed and are negative.          --------------------------------------------- PAST HISTORY ---------------------------------------------  Past Medical History:  has a past medical history of COPD (chronic obstructive pulmonary disease) ([address] and Hypertension. Past Surgical History:  has a past surgical history that includes Tonsillectomy; Uterine fibroid surgery; Carpal tunnel release (Right); and Coronary angioplasty with stent (02/24/2022). Social History:  reports that she quit smoking about 4 months ago. Her smoking use included cigarettes. She smoked 0.25 packs per day. She has never used smokeless tobacco. She reports previous alcohol use. She reports previous drug use. Family History: family history includes Heart Attack in her father and mother; Heart Surgery in her father. The patients home medications have been reviewed.     Allergies: Patient has no known allergies. ---------------------------------------------------PHYSICAL EXAM--------------------------------------    Constitutional/General: Alert and oriented x3, well appearing, non toxic in NAD  Head: Normocephalic and atraumatic  Eyes: PERRL, EOMI  Mouth: Oropharynx clear, handling secretions, no trismus  Neck: Supple, full ROM, non tender to palpation in the midline, no stridor, no crepitus, no meningeal signs  Pulmonary: Lungs clear to auscultation bilaterally, no wheezes, rales, or rhonchi. Not in respiratory distress  Cardiovascular:  Regular rate. Regular rhythm. No murmurs, gallops, or rubs. 2+ distal pulses  Chest: no chest wall tenderness  Abdomen: Soft. Non tender. Non distended. +BS. No rebound, guarding, or rigidity. No pulsatile masses appreciated. Musculoskeletal: Moves all extremities x 4. Warm and well perfused, no clubbing, cyanosis, or edema. Capillary refill <3 seconds  Skin: warm and dry. No rashes. Neurologic: GCS 15, CN 2-12 grossly intact, no focal deficits, symmetric strength 5/5 in the upper and lower extremities bilaterally  Psych: Normal Affect    -------------------------------------------------- RESULTS -------------------------------------------------  I have personally reviewed all laboratory and imaging results for this patient. Results are listed below.      LABS:  Results for orders placed or performed during the hospital encounter of 07/09/22   CBC with Auto Differential   Result Value Ref Range    WBC 5.8 4.5 - 11.5 E9/L    RBC 4.25 3.50 - 5.50 E12/L    Hemoglobin 12.5 11.5 - 15.5 g/dL    Hematocrit 38.2 34.0 - 48.0 %    MCV 89.9 80.0 - 99.9 fL    MCH 29.4 26.0 - 35.0 pg    MCHC 32.7 32.0 - 34.5 %    RDW 14.3 11.5 - 15.0 fL    Platelets 795 484 - 198 E9/L    MPV 11.1 7.0 - 12.0 fL    Neutrophils % 56.9 43.0 - 80.0 %    Immature Granulocytes % 0.5 0.0 - 5.0 %    Lymphocytes % 28.2 20.0 - 42.0 %    Monocytes % 13.0 (H) 2.0 - 12.0 %    Eosinophils % 0.9 0.0 - 6.0 % Basophils % 0.5 0.0 - 2.0 %    Neutrophils Absolute 3.28 1.80 - 7.30 E9/L    Immature Granulocytes # 0.03 E9/L    Lymphocytes Absolute 1.63 1.50 - 4.00 E9/L    Monocytes Absolute 0.75 0.10 - 0.95 E9/L    Eosinophils Absolute 0.05 0.05 - 0.50 E9/L    Basophils Absolute 0.03 0.00 - 0.20 N6/B   Basic Metabolic Panel w/ Reflex to MG   Result Value Ref Range    Sodium 141 132 - 146 mmol/L    Potassium reflex Magnesium 3.6 3.5 - 5.0 mmol/L    Chloride 105 98 - 107 mmol/L    CO2 21 (L) 22 - 29 mmol/L    Anion Gap 15 7 - 16 mmol/L    Glucose 128 (H) 74 - 99 mg/dL    BUN 23 6 - 23 mg/dL    CREATININE 1.4 (H) 0.5 - 1.0 mg/dL    GFR Non-African American 38 >=60 mL/min/1.73    GFR African American 46     Calcium 8.4 (L) 8.6 - 10.2 mg/dL   Brain Natriuretic Peptide   Result Value Ref Range    Pro- (H) 0 - 125 pg/mL   SPECIMEN REJECTION   Result Value Ref Range    Rejected Test trp5     Reason for Rejection see below    Troponin   Result Value Ref Range    Troponin, High Sensitivity 18 (H) 0 - 9 ng/L   SPECIMEN REJECTION   Result Value Ref Range    Rejected Test trp5     Reason for Rejection see below    Troponin   Result Value Ref Range    Troponin, High Sensitivity 17 (H) 0 - 9 ng/L       RADIOLOGY:  Interpreted by Radiologist.  CTA CHEST W CONTRAST   Final Result   No acute aortic abnormality. CTA ABDOMEN PELVIS W CONTRAST   Final Result   No acute aortic abnormality. XR CHEST PORTABLE   Final Result   No acute process. EKG Interpretation  Interpreted by emergency department physician.     Rhythm: normal sinus   Rate: 66  Axis: normal  Conduction: normal  ST Segments: No acute ST elevation or depression  T Waves: Does have T wave inversion in lead V3    Clinical Impression: Sinus rhythm normal axis stable intervals no acute ST elevation depression  Comparison to Old EKG no acute changes          ------------------------- NURSING NOTES AND VITALS REVIEWED ---------------------------   The nursing notes within the ED encounter and vital signs as below have been reviewed by myself. /68   Pulse 81   Temp 97.5 °F (36.4 °C)   Resp 19   Ht 5' 4\" (1.626 m)   Wt 183 lb (83 kg)   SpO2 98%   BMI 31.41 kg/m²   Oxygen Saturation Interpretation: Normal    The patients available past medical records and past encounters were reviewed. ------------------------------ ED COURSE/MEDICAL DECISION MAKING----------------------  Medications   sodium chloride flush 0.9 % injection 10 mL (has no administration in time range)   0.9 % sodium chloride bolus (0 mLs IntraVENous Stopped 7/9/22 1803)   ondansetron (ZOFRAN) injection 4 mg (4 mg IntraVENous Given 7/9/22 1613)   fentaNYL (SUBLIMAZE) injection 50 mcg (50 mcg IntraVENous Given 7/9/22 1613)   iopamidol (ISOVUE-370) 76 % injection 90 mL (90 mLs IntraVENous Given 7/9/22 2152)           CAD Risk Factors:      Smoker:    No.       FHx:    No.       High Lipids:    Yes. HTN:    Yes. Diabetes:    No.       Cocaine Use:    No.       Lupus:    No.         The HEART Risk Score for Acute Coronary Syndrome  Age <46 years, 55-63, 65+  ?  1   > 2 Risk Factors for CAD?*, 1-2, 0  2   History: slight, moderate, highly suspicious  0   EKG: Normal, nonspecific repolarization changes, ST depression   0   Troponin; low, 1-3x normal  Limit, 3x+  0   Total HEART Score:  3     *Risk factors as follows:                  - History/Family history of CAD    - Hypertension      - Hyperlipidemia     - Diabetes mellitus  - Current smoker  - Obesity    Predicts 6-week risk of major adverse cardiac event. Low Score (0-3 points), risk of MACE of 0.9-1.7%. Moderate Score (4-6 points), risk of MACE of 12-16.6%. High Score (7-10 points), risk of MACE of 50-65%. Dissection/Aneurysm Risk Factors: Aortic Disease:    no (0). Aortic Valve Disease:    no (0). FHx:    no (0). Atrial Fibrillation:    no (0).       Pregnancy:    N/A.      HTN:    yes (1).      Marfan's / Sabiha-Danlos Syndrome:    no (0). Stanford's Syndrome:    no (0). Sympathomimetic Use:    no (0). Total:                                                              1.          Myocardial Infarction Core Measures:   Aspirin 323mg PO:  was given prior to arrival.   Beta Blocker: was not indicated   Thrombolytic Given: Not Indicated. * If STEMI then see nursing documentation for time of pt departure to cath-lab. Medical Decision Makin-year-old female presents emerged part complaining of chest pain. She does have a history of of a PCI intervention back in February for NSTEMI. Her troponin is not significantly elevated at this time delta less than 3. Remaining laboratory work-up is reassuring. Her chest pain is relieved here in the emergency department. She did receive aspirin nitro prior to arrival.  She is safe to be discharged home at this time and follow-up with her family doctor. She is agreeable this plan. Patient safe for discharge from the emergency department. Did advise on return precautions to the emergency department. Did also advise follow-up with her primary care physician. Patient agreeable with the plan moving forward. Re-Evaluations:             Re-evaluation. Patients symptoms show no change      Consultations:             none            This patient's ED course included: a personal history and physicial examination, re-evaluation prior to disposition, multiple bedside re-evaluations, cardiac monitoring, continuous pulse oximetry and a personal history and physicial eaxmination    This patient has remained hemodynamically stable during their ED course. Counseling: The emergency provider has spoken with the patient and discussed todays results, in addition to providing specific details for the plan of care and counseling regarding the diagnosis and prognosis.   Questions are answered at this time and they are agreeable with the plan.       --------------------------------- IMPRESSION AND DISPOSITION ---------------------------------    IMPRESSION  1.  Chest pain, unspecified type        DISPOSITION  Disposition: Discharge to home  Patient condition is stable             Cortney Valenzuela MD  Resident  07/10/22 [id number]

## 2022-07-09 NOTE — ED NOTES
Radiology Procedure Waiver   Name: Priscila Hay  : 1955  MRN: [id number]    Date:  22    Time: 7:08 PM EDT    Benefits of immediately proceeding with radiology exam(s) without pre-testing outweigh the risks or are not indicated as specified below and therefore the following is/are being waived:    [x] Benefits of immediate radiology exam(s) outweigh any risk. OR    Pre-exam testing is not indicated for the following reason(s):  [] Pregnancy test   [] Patients LMP on-time and regular.   [] Patient had Tubal Ligation or has other Contraception Device. [] Patient  is Menopausal or Premenarcheal.    [] Patient had Full or Partial Hysterectomy. [] Protocol for CT contrast allegry   [] Patient has tolerated well previously   [] Patient does not have a true allergy    [] MRI Questionnaire     [] BUN/Creatinine   [] Patient age w/no hx of renal dysfunction. [] Patient on Dialysis. [] Recent Normal Labs.   Electronically signed by Maliha Posey MD on 22 at 7:08 PM EDT               Maliha Posey MD  Resident  22 [phone]

## 2022-07-10 NOTE — ED NOTES
Radiology Procedure Waiver   Name: Rosario Castelan  : 1955  MRN: [id number]    Date:  22    Time: 8:15 PM EDT    Benefits of immediately proceeding with radiology exam(s) without pre-testing outweigh the risks or are not indicated as specified below and therefore the following is/are being waived:    [x] Benefits of immediate radiology exam(s) outweigh any risk. OR    Pre-exam testing is not indicated for the following reason(s):  [] Pregnancy test   [] Patients LMP on-time and regular.   [] Patient had Tubal Ligation or has other Contraception Device. [] Patient  is Menopausal or Premenarcheal.    [] Patient had Full or Partial Hysterectomy. [] Protocol for CT contrast allegry   [] Patient has tolerated well previously   [] Patient does not have a true allergy    [] MRI Questionnaire     [] BUN/Creatinine   [] Patient age w/no hx of renal dysfunction. [] Patient on Dialysis. [] Recent Normal Labs.   Electronically signed by Magali Saavedra MD on 22 at 8:15 PM EDT               Magali Saavedra MD  Resident  22 [phone]

## 2022-07-11 LAB
EKG ATRIAL RATE: 66 BPM
EKG P AXIS: 80 DEGREES
EKG P-R INTERVAL: 138 MS
EKG Q-T INTERVAL: 436 MS
EKG QRS DURATION: 76 MS
EKG QTC CALCULATION (BAZETT): 457 MS
EKG R AXIS: 58 DEGREES
EKG T AXIS: 74 DEGREES
EKG VENTRICULAR RATE: 66 BPM

## 2023-02-16 RX ORDER — LOSARTAN POTASSIUM 50 MG/1
TABLET ORAL
Qty: 270 TABLET | Refills: 3 | Status: SHIPPED | OUTPATIENT
Start: 2023-02-16

## 2023-02-16 RX ORDER — METOPROLOL SUCCINATE 25 MG/1
25 TABLET, EXTENDED RELEASE ORAL DAILY
Qty: 270 TABLET | Refills: 3 | Status: SHIPPED | OUTPATIENT
Start: 2023-02-16

## 2023-02-16 RX ORDER — CLOPIDOGREL BISULFATE 75 MG/1
75 TABLET ORAL DAILY
Qty: 270 TABLET | Refills: 3 | Status: SHIPPED | OUTPATIENT
Start: 2023-02-16

## 2023-02-16 RX ORDER — ATORVASTATIN CALCIUM 80 MG/1
TABLET, FILM COATED ORAL
Qty: 270 TABLET | Refills: 3 | Status: SHIPPED | OUTPATIENT
Start: 2023-02-16

## 2024-03-08 RX ORDER — ATORVASTATIN CALCIUM 80 MG/1
TABLET, FILM COATED ORAL
Qty: 270 TABLET | Refills: 3 | OUTPATIENT
Start: 2024-03-08